# Patient Record
Sex: MALE | Race: BLACK OR AFRICAN AMERICAN | NOT HISPANIC OR LATINO | Employment: FULL TIME | URBAN - METROPOLITAN AREA
[De-identification: names, ages, dates, MRNs, and addresses within clinical notes are randomized per-mention and may not be internally consistent; named-entity substitution may affect disease eponyms.]

---

## 2023-02-02 PROBLEM — M54.50 LOWER BACK PAIN: Status: ACTIVE | Noted: 2023-02-02

## 2023-02-02 PROBLEM — H25.13 NUCLEAR SCLEROSIS OF BOTH EYES: Status: ACTIVE | Noted: 2023-02-02

## 2023-02-02 PROBLEM — A60.00 HERPES GENITALIA: Status: ACTIVE | Noted: 2023-02-02

## 2023-02-02 PROBLEM — M79.643 HAND PAIN: Status: ACTIVE | Noted: 2023-02-02

## 2023-02-02 PROBLEM — R10.13 ABDOMINAL PAIN, EPIGASTRIC: Status: ACTIVE | Noted: 2023-02-02

## 2023-02-02 PROBLEM — M48.061 SPINAL STENOSIS OF LUMBAR REGION: Status: ACTIVE | Noted: 2023-02-02

## 2023-02-02 PROBLEM — R06.00 DYSPNEA: Status: ACTIVE | Noted: 2023-02-02

## 2023-02-02 PROBLEM — L30.9 DERMATITIS: Status: ACTIVE | Noted: 2023-02-02

## 2023-02-02 PROBLEM — E78.49 FAMILIAL COMBINED HYPERLIPIDEMIA: Status: ACTIVE | Noted: 2023-02-02

## 2023-02-02 PROBLEM — M54.32 SCIATICA, LEFT SIDE: Status: ACTIVE | Noted: 2023-02-02

## 2023-02-02 PROBLEM — E66.812 CLASS 2 OBESITY WITH BODY MASS INDEX (BMI) OF 37.0 TO 37.9 IN ADULT: Status: ACTIVE | Noted: 2023-02-02

## 2023-02-02 PROBLEM — M25.569 JOINT PAIN, KNEE: Status: ACTIVE | Noted: 2023-02-02

## 2023-02-02 PROBLEM — E55.9 VITAMIN D DEFICIENCY: Status: ACTIVE | Noted: 2023-02-02

## 2023-02-02 PROBLEM — R10.30 GROIN PAIN: Status: ACTIVE | Noted: 2023-02-02

## 2023-02-02 PROBLEM — K40.90 INGUINAL HERNIA: Status: ACTIVE | Noted: 2023-02-02

## 2023-02-02 PROBLEM — N18.31 CHRONIC KIDNEY DISEASE, STAGE 3A (MULTI): Status: ACTIVE | Noted: 2023-02-02

## 2023-02-02 PROBLEM — H40.009 PREGLAUCOMA: Status: ACTIVE | Noted: 2023-02-02

## 2023-02-02 PROBLEM — E66.01 SEVERE OBESITY (BMI 35.0-35.9 WITH COMORBIDITY) (MULTI): Status: ACTIVE | Noted: 2023-02-02

## 2023-02-02 PROBLEM — R53.81 MALAISE: Status: ACTIVE | Noted: 2023-02-02

## 2023-02-02 PROBLEM — L03.113 CELLULITIS OF HAND, RIGHT: Status: ACTIVE | Noted: 2023-02-02

## 2023-02-02 PROBLEM — E66.9 OBESITY (BMI 30-39.9): Status: ACTIVE | Noted: 2023-02-02

## 2023-02-02 PROBLEM — R73.03 PREDIABETES: Status: ACTIVE | Noted: 2023-02-02

## 2023-02-02 PROBLEM — E11.9 DIABETES MELLITUS TYPE 2 WITHOUT RETINOPATHY (MULTI): Status: ACTIVE | Noted: 2023-02-02

## 2023-02-02 PROBLEM — E11.9 DIABETES (MULTI): Status: ACTIVE | Noted: 2023-02-02

## 2023-02-02 PROBLEM — M25.562 LEFT KNEE PAIN: Status: ACTIVE | Noted: 2023-02-02

## 2023-02-02 PROBLEM — I10 BENIGN ESSENTIAL HYPERTENSION: Status: ACTIVE | Noted: 2023-02-02

## 2023-02-02 PROBLEM — M54.16 LUMBAR RADICULOPATHY: Status: ACTIVE | Noted: 2023-02-02

## 2023-02-02 PROBLEM — N52.9 MALE ERECTILE DISORDER: Status: ACTIVE | Noted: 2023-02-02

## 2023-02-02 RX ORDER — GLIMEPIRIDE 1 MG/1
1 TABLET ORAL DAILY
COMMUNITY
End: 2023-03-16 | Stop reason: DRUGHIGH

## 2023-02-02 RX ORDER — GLIMEPIRIDE 4 MG/1
1 TABLET ORAL DAILY
COMMUNITY
Start: 2021-05-24 | End: 2023-06-19 | Stop reason: SDUPTHER

## 2023-02-02 RX ORDER — BLOOD SUGAR DIAGNOSTIC
STRIP MISCELLANEOUS
COMMUNITY
Start: 2018-05-18

## 2023-02-02 RX ORDER — LISINOPRIL AND HYDROCHLOROTHIAZIDE 20; 25 MG/1; MG/1
1 TABLET ORAL DAILY
COMMUNITY
Start: 2021-11-23 | End: 2023-09-22 | Stop reason: SDUPTHER

## 2023-02-02 RX ORDER — LANCETS
EACH MISCELLANEOUS
COMMUNITY

## 2023-02-02 RX ORDER — ATORVASTATIN CALCIUM 80 MG/1
1 TABLET, FILM COATED ORAL DAILY
COMMUNITY
Start: 2022-02-23 | End: 2023-09-21

## 2023-02-02 RX ORDER — ASPIRIN 81 MG/1
1 TABLET ORAL DAILY
COMMUNITY
Start: 2018-02-21

## 2023-02-02 RX ORDER — LANCETS 33 GAUGE
EACH MISCELLANEOUS
COMMUNITY
End: 2023-11-13 | Stop reason: SDUPTHER

## 2023-03-16 ENCOUNTER — OFFICE VISIT (OUTPATIENT)
Dept: PRIMARY CARE | Facility: CLINIC | Age: 68
End: 2023-03-16
Payer: MEDICARE

## 2023-03-16 ENCOUNTER — LAB (OUTPATIENT)
Dept: LAB | Facility: LAB | Age: 68
End: 2023-03-16
Payer: MEDICARE

## 2023-03-16 VITALS
HEART RATE: 92 BPM | WEIGHT: 222 LBS | BODY MASS INDEX: 36.94 KG/M2 | SYSTOLIC BLOOD PRESSURE: 118 MMHG | DIASTOLIC BLOOD PRESSURE: 76 MMHG

## 2023-03-16 DIAGNOSIS — M48.061 SPINAL STENOSIS OF LUMBAR REGION WITHOUT NEUROGENIC CLAUDICATION: ICD-10-CM

## 2023-03-16 DIAGNOSIS — E66.01 OBESITY, MORBID (MULTI): ICD-10-CM

## 2023-03-16 DIAGNOSIS — E55.9 VITAMIN D DEFICIENCY: ICD-10-CM

## 2023-03-16 DIAGNOSIS — R35.0 BENIGN PROSTATIC HYPERPLASIA WITH URINARY FREQUENCY: ICD-10-CM

## 2023-03-16 DIAGNOSIS — I10 BENIGN ESSENTIAL HYPERTENSION: ICD-10-CM

## 2023-03-16 DIAGNOSIS — E11.9 TYPE 2 DIABETES MELLITUS WITHOUT COMPLICATION, WITHOUT LONG-TERM CURRENT USE OF INSULIN (MULTI): Primary | ICD-10-CM

## 2023-03-16 DIAGNOSIS — E11.9 TYPE 2 DIABETES MELLITUS WITHOUT COMPLICATION, WITHOUT LONG-TERM CURRENT USE OF INSULIN (MULTI): ICD-10-CM

## 2023-03-16 DIAGNOSIS — R53.81 MALAISE: ICD-10-CM

## 2023-03-16 DIAGNOSIS — Z12.5 SCREENING PSA (PROSTATE SPECIFIC ANTIGEN): ICD-10-CM

## 2023-03-16 DIAGNOSIS — Z00.00 ROUTINE GENERAL MEDICAL EXAMINATION AT HEALTH CARE FACILITY: ICD-10-CM

## 2023-03-16 DIAGNOSIS — N40.1 BENIGN PROSTATIC HYPERPLASIA WITH URINARY FREQUENCY: ICD-10-CM

## 2023-03-16 DIAGNOSIS — N18.31 CHRONIC KIDNEY DISEASE, STAGE 3A (MULTI): ICD-10-CM

## 2023-03-16 LAB
ALANINE AMINOTRANSFERASE (SGPT) (U/L) IN SER/PLAS: 25 U/L (ref 10–52)
ALBUMIN (G/DL) IN SER/PLAS: 4 G/DL (ref 3.4–5)
ALBUMIN (MG/L) IN URINE: 21.2 MG/L
ALBUMIN/CREATININE (UG/MG) IN URINE: 17 UG/MG CRT (ref 0–30)
ALKALINE PHOSPHATASE (U/L) IN SER/PLAS: 120 U/L (ref 33–136)
ANION GAP IN SER/PLAS: 16 MMOL/L (ref 10–20)
APPEARANCE, URINE: CLEAR
ASPARTATE AMINOTRANSFERASE (SGOT) (U/L) IN SER/PLAS: 36 U/L (ref 9–39)
BASOPHILS (10*3/UL) IN BLOOD BY AUTOMATED COUNT: 0.08 X10E9/L (ref 0–0.1)
BASOPHILS/100 LEUKOCYTES IN BLOOD BY AUTOMATED COUNT: 1.1 % (ref 0–2)
BILIRUBIN TOTAL (MG/DL) IN SER/PLAS: 0.6 MG/DL (ref 0–1.2)
BILIRUBIN, URINE: NEGATIVE
BLOOD, URINE: NEGATIVE
CALCIDIOL (25 OH VITAMIN D3) (NG/ML) IN SER/PLAS: 30 NG/ML
CALCIUM (MG/DL) IN SER/PLAS: 9.4 MG/DL (ref 8.6–10.6)
CARBON DIOXIDE, TOTAL (MMOL/L) IN SER/PLAS: 25 MMOL/L (ref 21–32)
CHLORIDE (MMOL/L) IN SER/PLAS: 102 MMOL/L (ref 98–107)
CHOLESTEROL (MG/DL) IN SER/PLAS: 166 MG/DL (ref 0–199)
CHOLESTEROL IN HDL (MG/DL) IN SER/PLAS: 30.7 MG/DL
CHOLESTEROL/HDL RATIO: 5.4
COLOR, URINE: YELLOW
CREATININE (MG/DL) IN SER/PLAS: 1.52 MG/DL (ref 0.5–1.3)
CREATININE (MG/DL) IN URINE: 125 MG/DL (ref 20–370)
EOSINOPHILS (10*3/UL) IN BLOOD BY AUTOMATED COUNT: 0.09 X10E9/L (ref 0–0.7)
EOSINOPHILS/100 LEUKOCYTES IN BLOOD BY AUTOMATED COUNT: 1.3 % (ref 0–6)
ERYTHROCYTE DISTRIBUTION WIDTH (RATIO) BY AUTOMATED COUNT: 13.5 % (ref 11.5–14.5)
ERYTHROCYTE MEAN CORPUSCULAR HEMOGLOBIN CONCENTRATION (G/DL) BY AUTOMATED: 32.5 G/DL (ref 32–36)
ERYTHROCYTE MEAN CORPUSCULAR VOLUME (FL) BY AUTOMATED COUNT: 88 FL (ref 80–100)
ERYTHROCYTES (10*6/UL) IN BLOOD BY AUTOMATED COUNT: 5.72 X10E12/L (ref 4.5–5.9)
GFR MALE: 49 ML/MIN/1.73M2
GLUCOSE (MG/DL) IN SER/PLAS: 180 MG/DL (ref 74–99)
GLUCOSE, URINE: ABNORMAL MG/DL
HEMATOCRIT (%) IN BLOOD BY AUTOMATED COUNT: 50.1 % (ref 41–52)
HEMOGLOBIN (G/DL) IN BLOOD: 16.3 G/DL (ref 13.5–17.5)
IMMATURE GRANULOCYTES/100 LEUKOCYTES IN BLOOD BY AUTOMATED COUNT: 0.6 % (ref 0–0.9)
KETONES, URINE: NEGATIVE MG/DL
LDL: 86 MG/DL (ref 0–99)
LEUKOCYTE ESTERASE, URINE: NEGATIVE
LEUKOCYTES (10*3/UL) IN BLOOD BY AUTOMATED COUNT: 7.2 X10E9/L (ref 4.4–11.3)
LYMPHOCYTES (10*3/UL) IN BLOOD BY AUTOMATED COUNT: 2.17 X10E9/L (ref 1.2–4.8)
LYMPHOCYTES/100 LEUKOCYTES IN BLOOD BY AUTOMATED COUNT: 30.3 % (ref 13–44)
MONOCYTES (10*3/UL) IN BLOOD BY AUTOMATED COUNT: 0.6 X10E9/L (ref 0.1–1)
MONOCYTES/100 LEUKOCYTES IN BLOOD BY AUTOMATED COUNT: 8.4 % (ref 2–10)
NEUTROPHILS (10*3/UL) IN BLOOD BY AUTOMATED COUNT: 4.19 X10E9/L (ref 1.2–7.7)
NEUTROPHILS/100 LEUKOCYTES IN BLOOD BY AUTOMATED COUNT: 58.3 % (ref 40–80)
NITRITE, URINE: NEGATIVE
NON HDL CHOLESTEROL: 135 MG/DL
NRBC (PER 100 WBCS) BY AUTOMATED COUNT: 0 /100 WBC (ref 0–0)
PH, URINE: 5 (ref 5–8)
PLATELETS (10*3/UL) IN BLOOD AUTOMATED COUNT: 170 X10E9/L (ref 150–450)
POC FINGERSTICK BLOOD GLUCOSE: 179 MG/DL (ref 70–100)
POC HEMOGLOBIN A1C: 8.5 % (ref 4.2–6.5)
POTASSIUM (MMOL/L) IN SER/PLAS: 3.9 MMOL/L (ref 3.5–5.3)
PROSTATE SPECIFIC AG (NG/ML) IN SER/PLAS: 4.01 NG/ML (ref 0–4)
PROTEIN TOTAL: 7.2 G/DL (ref 6.4–8.2)
PROTEIN, URINE: NEGATIVE MG/DL
SODIUM (MMOL/L) IN SER/PLAS: 139 MMOL/L (ref 136–145)
SPECIFIC GRAVITY, URINE: 1.02 (ref 1–1.03)
THYROTROPIN (MIU/L) IN SER/PLAS BY DETECTION LIMIT <= 0.05 MIU/L: 1.62 MIU/L (ref 0.44–3.98)
TRIGLYCERIDE (MG/DL) IN SER/PLAS: 246 MG/DL (ref 0–149)
URATE (MG/DL) IN SER/PLAS: 6.9 MG/DL (ref 4–7.5)
UREA NITROGEN (MG/DL) IN SER/PLAS: 19 MG/DL (ref 6–23)
UROBILINOGEN, URINE: <2 MG/DL (ref 0–1.9)
VLDL: 49 MG/DL (ref 0–40)

## 2023-03-16 PROCEDURE — 84153 ASSAY OF PSA TOTAL: CPT

## 2023-03-16 PROCEDURE — 82306 VITAMIN D 25 HYDROXY: CPT

## 2023-03-16 PROCEDURE — G0439 PPPS, SUBSEQ VISIT: HCPCS | Performed by: INTERNAL MEDICINE

## 2023-03-16 PROCEDURE — 82570 ASSAY OF URINE CREATININE: CPT

## 2023-03-16 PROCEDURE — 99213 OFFICE O/P EST LOW 20 MIN: CPT | Performed by: INTERNAL MEDICINE

## 2023-03-16 PROCEDURE — 1159F MED LIST DOCD IN RCRD: CPT | Performed by: INTERNAL MEDICINE

## 2023-03-16 PROCEDURE — 81003 URINALYSIS AUTO W/O SCOPE: CPT

## 2023-03-16 PROCEDURE — 82043 UR ALBUMIN QUANTITATIVE: CPT

## 2023-03-16 PROCEDURE — 80061 LIPID PANEL: CPT

## 2023-03-16 PROCEDURE — 36415 COLL VENOUS BLD VENIPUNCTURE: CPT

## 2023-03-16 PROCEDURE — 3078F DIAST BP <80 MM HG: CPT | Performed by: INTERNAL MEDICINE

## 2023-03-16 PROCEDURE — 3074F SYST BP LT 130 MM HG: CPT | Performed by: INTERNAL MEDICINE

## 2023-03-16 PROCEDURE — 80053 COMPREHEN METABOLIC PANEL: CPT

## 2023-03-16 PROCEDURE — 84550 ASSAY OF BLOOD/URIC ACID: CPT

## 2023-03-16 PROCEDURE — 1160F RVW MEDS BY RX/DR IN RCRD: CPT | Performed by: INTERNAL MEDICINE

## 2023-03-16 PROCEDURE — 82962 GLUCOSE BLOOD TEST: CPT | Performed by: INTERNAL MEDICINE

## 2023-03-16 PROCEDURE — 84443 ASSAY THYROID STIM HORMONE: CPT

## 2023-03-16 PROCEDURE — 1170F FXNL STATUS ASSESSED: CPT | Performed by: INTERNAL MEDICINE

## 2023-03-16 PROCEDURE — 85025 COMPLETE CBC W/AUTO DIFF WBC: CPT

## 2023-03-16 PROCEDURE — 83036 HEMOGLOBIN GLYCOSYLATED A1C: CPT | Performed by: INTERNAL MEDICINE

## 2023-03-16 RX ORDER — GLIMEPIRIDE 2 MG/1
2 TABLET ORAL
Qty: 30 TABLET | Refills: 11 | Status: SHIPPED | OUTPATIENT
Start: 2023-03-16 | End: 2023-06-19 | Stop reason: SDUPTHER

## 2023-03-16 ASSESSMENT — ENCOUNTER SYMPTOMS
RHINORRHEA: 0
SHORTNESS OF BREATH: 0
SINUS PAIN: 0
SLEEP DISTURBANCE: 0
TROUBLE SWALLOWING: 0
COUGH: 0
ARTHRALGIAS: 0
WHEEZING: 0
CHEST TIGHTNESS: 0
NAUSEA: 0
HEADACHES: 0
FACIAL SWELLING: 0
CONSTIPATION: 0
DIZZINESS: 0
ADENOPATHY: 0
POLYPHAGIA: 0
ABDOMINAL DISTENTION: 0
STRIDOR: 0
APPETITE CHANGE: 0
HEMATURIA: 0
WOUND: 0
DYSURIA: 0
VOMITING: 0
DIARRHEA: 0
EYE ITCHING: 0
CHOKING: 0
SORE THROAT: 0
VOICE CHANGE: 0
FREQUENCY: 0
JOINT SWELLING: 0
BLOOD IN STOOL: 0
ANAL BLEEDING: 0
LIGHT-HEADEDNESS: 0
MYALGIAS: 0
CHILLS: 0
PALPITATIONS: 0
SEIZURES: 0
BACK PAIN: 0
ACTIVITY CHANGE: 0
BRUISES/BLEEDS EASILY: 0
EYE REDNESS: 0
NECK STIFFNESS: 0
WEAKNESS: 0
COLOR CHANGE: 0
PHOTOPHOBIA: 0
DIAPHORESIS: 0
SPEECH DIFFICULTY: 0
FACIAL ASYMMETRY: 0
RECTAL PAIN: 0
FLANK PAIN: 0
POLYDIPSIA: 0
NUMBNESS: 0
EYE DISCHARGE: 0
DIFFICULTY URINATING: 0
TREMORS: 0
EYE PAIN: 0
NECK PAIN: 0
SINUS PRESSURE: 0
FATIGUE: 0
ABDOMINAL PAIN: 0

## 2023-03-16 ASSESSMENT — ACTIVITIES OF DAILY LIVING (ADL)
BATHING: INDEPENDENT
DRESSING: INDEPENDENT
DOING_HOUSEWORK: INDEPENDENT
MANAGING_FINANCES: INDEPENDENT
GROCERY_SHOPPING: INDEPENDENT
TAKING_MEDICATION: INDEPENDENT

## 2023-03-16 NOTE — PROGRESS NOTES
Subjective   Reason for Visit: Ninfa Araujo is an 67 y.o. male here for a Medicare Wellness visit.          Reviewed all medications by prescribing practitioner or clinical pharmacist (such as prescriptions, OTCs, herbal therapies and supplements) and documented in the medical record.    Patient presents for wellness exam and follow-up.  He has been compliant with his medications but not diet or exercise.  He overall feels well.  He denies any headaches, no dizziness, no chest pain or shortness of breath.  Denies abdominal pain no nausea vomiting or diarrhea.  Reports no new musculoskeletal complaints.        Patient Self Assessment of Health Status  Patient Self Assessment: Good    Nutrition and Exercise  Current Diet: Unhealthy Diet  Adequate Fluid Intake: Yes  Caffeine: Yes  Exercise Frequency: Infrequently    Functional Ability/Level of Safety  Cognitive Impairment Observed: No cognitive impairment observed  Cognitive Impairment Reported: No cognitive impairment reported by patient or family    Home Safety Risk Factors: No grab bars, bathroom    Patient Care Team:  Yogesh Garcia MD as PCP - General  Yogesh Garcia MD as PCP - [employer] PCP     Review of Systems   Constitutional:  Negative for activity change, appetite change, chills, diaphoresis and fatigue.   HENT:  Negative for congestion, dental problem, drooling, ear discharge, ear pain, facial swelling, hearing loss, mouth sores, nosebleeds, postnasal drip, rhinorrhea, sinus pressure, sinus pain, sneezing, sore throat, tinnitus, trouble swallowing and voice change.    Eyes:  Negative for photophobia, pain, discharge, redness, itching and visual disturbance.   Respiratory:  Negative for cough, choking, chest tightness, shortness of breath, wheezing and stridor.    Cardiovascular:  Negative for chest pain, palpitations and leg swelling.   Gastrointestinal:  Negative for abdominal distention, abdominal pain, anal bleeding, blood in stool,  constipation, diarrhea, nausea, rectal pain and vomiting.   Endocrine: Negative for cold intolerance, heat intolerance, polydipsia, polyphagia and polyuria.   Genitourinary:  Negative for decreased urine volume, difficulty urinating, dysuria, enuresis, flank pain, frequency, genital sores, hematuria and urgency.   Musculoskeletal:  Negative for arthralgias, back pain, gait problem, joint swelling, myalgias, neck pain and neck stiffness.   Skin:  Negative for color change, pallor, rash and wound.   Neurological:  Negative for dizziness, tremors, seizures, syncope, facial asymmetry, speech difficulty, weakness, light-headedness, numbness and headaches.   Hematological:  Negative for adenopathy. Does not bruise/bleed easily.   Psychiatric/Behavioral:  Negative for sleep disturbance.        Objective   Vitals:  Wt 101 kg (222 lb)   BMI 36.94 kg/m²       Physical Exam  Constitutional:       Appearance: Normal appearance.   Cardiovascular:      Rate and Rhythm: Normal rate and regular rhythm.      Heart sounds: No murmur heard.     No gallop.   Pulmonary:      Effort: No respiratory distress.      Breath sounds: No wheezing or rales.   Abdominal:      General: There is no distension.      Palpations: There is no mass.      Tenderness: There is no abdominal tenderness. There is no guarding.   Musculoskeletal:      Right lower leg: No edema.      Left lower leg: No edema.   Neurological:      Mental Status: He is alert.     Health maintenance-colonoscopy has been done.  Refuses immunizations.    Assessment/Plan   problem List Items Addressed This Visit          Circulatory    Benign essential hypertension-he will follow a low-salt diet and exercise    Relevant Orders    Uric Acid    Urinalysis with Reflex Microscopic       Genitourinary    Chronic kidney disease, stage 3a    Overview     2/23/22 Chronic Condition Documentation: Stable based on last GFR. Continue established treatment plan including avoidance of  nephrotoxins and follow up at least yearly.         Current Assessment & Plan     Will; recheck creatinine.            Endocrine/Metabolic    Vitamin D deficiency    Relevant Orders    Vitamin D, Total    Obesity, morbid (CMS/HCC)    Current Assessment & Plan     Diet and exercise.            Other    Malaise    Relevant Orders    CBC and Auto Differential    TSH with reflex to Free T4 if abnormal     Other Visit Diagnoses       Type 2 diabetes mellitus without complication, without long-term current use of insulin (CMS/[facility])    -  Primary-hemoglobin A1c was 8.6.  He refuses an injectable agent.  Will increase glimepiride to 2 mg in the evening.  He will diet and exercise.  Risk of poor diabetic control explained.    Relevant Medications    glimepiride (AmaryL) 2 mg tablet    Other Relevant Orders    POCT Fingerstick Glucose manually resulted    POCT glycosylated hemoglobin (Hb A1C) manually resulted    Albumin , Urine Random    Comprehensive Metabolic Panel    Screening PSA (prostate specific antigen)        Relevant Orders    Prostate Specific Antigen    Benign prostatic hyperplasia with urinary frequency        Relevant Orders    Prostate Specific Antigen

## 2023-03-17 ENCOUNTER — TELEPHONE (OUTPATIENT)
Dept: PRIMARY CARE | Facility: CLINIC | Age: 68
End: 2023-03-17
Payer: MEDICARE

## 2023-03-17 DIAGNOSIS — I10 BENIGN ESSENTIAL HYPERTENSION: Primary | ICD-10-CM

## 2023-03-17 DIAGNOSIS — N40.0 BENIGN PROSTATIC HYPERPLASIA WITHOUT LOWER URINARY TRACT SYMPTOMS: ICD-10-CM

## 2023-03-17 DIAGNOSIS — Z12.5 SCREENING PSA (PROSTATE SPECIFIC ANTIGEN): ICD-10-CM

## 2023-03-17 NOTE — TELEPHONE ENCOUNTER
I spoke with patient. Take chol meds daily. LDL <70. Diet/exercise. Recheck PSA + creatinine. No NSAIDS

## 2023-05-31 DIAGNOSIS — I10 ESSENTIAL (PRIMARY) HYPERTENSION: ICD-10-CM

## 2023-06-05 RX ORDER — GLIMEPIRIDE 1 MG/1
TABLET ORAL
Qty: 90 TABLET | Refills: 1 | Status: SHIPPED | OUTPATIENT
Start: 2023-06-05 | End: 2023-06-19 | Stop reason: SDUPTHER

## 2023-06-16 RX ORDER — CHLORHEXIDINE GLUCONATE ORAL RINSE 1.2 MG/ML
15 SOLUTION DENTAL AS NEEDED
COMMUNITY
Start: 2023-05-23 | End: 2023-06-19 | Stop reason: WASHOUT

## 2023-06-16 RX ORDER — AMOXICILLIN 500 MG/1
500 TABLET, FILM COATED ORAL EVERY 6 HOURS
COMMUNITY
Start: 2023-05-23 | End: 2023-06-19 | Stop reason: WASHOUT

## 2023-06-16 RX ORDER — IBUPROFEN 800 MG/1
800 TABLET ORAL EVERY 6 HOURS PRN
COMMUNITY
Start: 2023-05-23 | End: 2023-06-19 | Stop reason: WASHOUT

## 2023-06-19 ENCOUNTER — LAB (OUTPATIENT)
Dept: LAB | Facility: LAB | Age: 68
End: 2023-06-19
Payer: MEDICARE

## 2023-06-19 ENCOUNTER — OFFICE VISIT (OUTPATIENT)
Dept: PRIMARY CARE | Facility: CLINIC | Age: 68
End: 2023-06-19
Payer: MEDICARE

## 2023-06-19 VITALS
TEMPERATURE: 97.4 F | HEIGHT: 65 IN | DIASTOLIC BLOOD PRESSURE: 80 MMHG | HEART RATE: 88 BPM | BODY MASS INDEX: 36.19 KG/M2 | SYSTOLIC BLOOD PRESSURE: 124 MMHG | WEIGHT: 217.2 LBS

## 2023-06-19 DIAGNOSIS — Z12.5 SCREENING PSA (PROSTATE SPECIFIC ANTIGEN): ICD-10-CM

## 2023-06-19 DIAGNOSIS — E78.49 FAMILIAL COMBINED HYPERLIPIDEMIA: ICD-10-CM

## 2023-06-19 DIAGNOSIS — I10 BENIGN ESSENTIAL HYPERTENSION: ICD-10-CM

## 2023-06-19 DIAGNOSIS — R06.00 DYSPNEA, UNSPECIFIED TYPE: Primary | ICD-10-CM

## 2023-06-19 DIAGNOSIS — E11.9 DIABETES MELLITUS TYPE 2 WITHOUT RETINOPATHY (MULTI): ICD-10-CM

## 2023-06-19 DIAGNOSIS — E66.01 OBESITY, MORBID (MULTI): ICD-10-CM

## 2023-06-19 PROBLEM — R73.03 PREDIABETES: Status: RESOLVED | Noted: 2023-02-02 | Resolved: 2023-06-19

## 2023-06-19 PROBLEM — L30.9 DERMATITIS: Status: RESOLVED | Noted: 2023-02-02 | Resolved: 2023-06-19

## 2023-06-19 PROBLEM — R10.13 ABDOMINAL PAIN, EPIGASTRIC: Status: RESOLVED | Noted: 2023-02-02 | Resolved: 2023-06-19

## 2023-06-19 PROBLEM — L03.113 CELLULITIS OF HAND, RIGHT: Status: RESOLVED | Noted: 2023-02-02 | Resolved: 2023-06-19

## 2023-06-19 LAB
ANION GAP IN SER/PLAS: 14 MMOL/L (ref 10–20)
CALCIUM (MG/DL) IN SER/PLAS: 10 MG/DL (ref 8.6–10.6)
CARBON DIOXIDE, TOTAL (MMOL/L) IN SER/PLAS: 29 MMOL/L (ref 21–32)
CHLORIDE (MMOL/L) IN SER/PLAS: 100 MMOL/L (ref 98–107)
CREATININE (MG/DL) IN SER/PLAS: 1.33 MG/DL (ref 0.5–1.3)
GFR MALE: 58 ML/MIN/1.73M2
GLUCOSE (MG/DL) IN SER/PLAS: 143 MG/DL (ref 74–99)
POC HEMOGLOBIN A1C: 7.2 % (ref 4.2–6.5)
POTASSIUM (MMOL/L) IN SER/PLAS: 4.2 MMOL/L (ref 3.5–5.3)
PROSTATE SPECIFIC ANTIGEN,SCREEN: 3.84 NG/ML (ref 0–4)
SODIUM (MMOL/L) IN SER/PLAS: 139 MMOL/L (ref 136–145)
UREA NITROGEN (MG/DL) IN SER/PLAS: 20 MG/DL (ref 6–23)

## 2023-06-19 PROCEDURE — 80048 BASIC METABOLIC PNL TOTAL CA: CPT

## 2023-06-19 PROCEDURE — 1160F RVW MEDS BY RX/DR IN RCRD: CPT | Performed by: INTERNAL MEDICINE

## 2023-06-19 PROCEDURE — 83036 HEMOGLOBIN GLYCOSYLATED A1C: CPT | Performed by: INTERNAL MEDICINE

## 2023-06-19 PROCEDURE — G0103 PSA SCREENING: HCPCS

## 2023-06-19 PROCEDURE — 1036F TOBACCO NON-USER: CPT | Performed by: INTERNAL MEDICINE

## 2023-06-19 PROCEDURE — 36415 COLL VENOUS BLD VENIPUNCTURE: CPT

## 2023-06-19 PROCEDURE — 1159F MED LIST DOCD IN RCRD: CPT | Performed by: INTERNAL MEDICINE

## 2023-06-19 PROCEDURE — 3079F DIAST BP 80-89 MM HG: CPT | Performed by: INTERNAL MEDICINE

## 2023-06-19 PROCEDURE — 3074F SYST BP LT 130 MM HG: CPT | Performed by: INTERNAL MEDICINE

## 2023-06-19 PROCEDURE — 99213 OFFICE O/P EST LOW 20 MIN: CPT | Performed by: INTERNAL MEDICINE

## 2023-06-19 RX ORDER — GLIMEPIRIDE 4 MG/1
4 TABLET ORAL 2 TIMES DAILY
Qty: 180 TABLET | Refills: 3 | Status: SHIPPED | OUTPATIENT
Start: 2023-06-19

## 2023-06-19 ASSESSMENT — ENCOUNTER SYMPTOMS
STRIDOR: 0
RHINORRHEA: 0
PHOTOPHOBIA: 0
ACTIVITY CHANGE: 0
SLEEP DISTURBANCE: 0
SINUS PAIN: 0
NECK PAIN: 0
SORE THROAT: 0
VOMITING: 0
ABDOMINAL PAIN: 0
EYE PAIN: 0
CHILLS: 0
DIAPHORESIS: 0
HEMATURIA: 0
SPEECH DIFFICULTY: 0
DIFFICULTY URINATING: 0
DYSURIA: 0
POLYPHAGIA: 0
FATIGUE: 0
NUMBNESS: 0
MYALGIAS: 0
ANAL BLEEDING: 0
BLOOD IN STOOL: 0
JOINT SWELLING: 0
FREQUENCY: 0
CHEST TIGHTNESS: 0
RECTAL PAIN: 0
PALPITATIONS: 0
NECK STIFFNESS: 0
WOUND: 0
VOICE CHANGE: 0
FACIAL ASYMMETRY: 0
SHORTNESS OF BREATH: 0
SEIZURES: 0
TREMORS: 0
ARTHRALGIAS: 0
EYE REDNESS: 0
COUGH: 0
POLYDIPSIA: 0
WHEEZING: 0
BRUISES/BLEEDS EASILY: 0
HEADACHES: 0
TROUBLE SWALLOWING: 0
BACK PAIN: 0
DIZZINESS: 0
COLOR CHANGE: 0
NAUSEA: 0
ABDOMINAL DISTENTION: 0
CONSTIPATION: 0
LIGHT-HEADEDNESS: 0
EYE ITCHING: 0
EYE DISCHARGE: 0
DIARRHEA: 0
FLANK PAIN: 0
CHOKING: 0
FACIAL SWELLING: 0
APPETITE CHANGE: 0
SINUS PRESSURE: 0
WEAKNESS: 0
ADENOPATHY: 0

## 2023-06-19 NOTE — PROGRESS NOTES
Subjective   Patient ID: Ninfa Araujo is a 67 y.o. male who presents for Follow-up.    Patient presents for follow-up.  He has been compliant occasions, diet and exercise.  Reports baseline knee and back pain.  He overall feels well.  Denies any headaches, no dizziness, no chest pain or shortness of breath.  No abdominal pain no nausea vomiting or diarrhea.  He reports no other new musculoskeletal complaints.         Review of Systems   Constitutional:  Negative for activity change, appetite change, chills, diaphoresis and fatigue.   HENT:  Negative for congestion, dental problem, drooling, ear discharge, ear pain, facial swelling, hearing loss, mouth sores, nosebleeds, postnasal drip, rhinorrhea, sinus pressure, sinus pain, sneezing, sore throat, tinnitus, trouble swallowing and voice change.    Eyes:  Negative for photophobia, pain, discharge, redness, itching and visual disturbance.   Respiratory:  Negative for cough, choking, chest tightness, shortness of breath, wheezing and stridor.    Cardiovascular:  Negative for chest pain, palpitations and leg swelling.   Gastrointestinal:  Negative for abdominal distention, abdominal pain, anal bleeding, blood in stool, constipation, diarrhea, nausea, rectal pain and vomiting.   Endocrine: Negative for cold intolerance, heat intolerance, polydipsia, polyphagia and polyuria.   Genitourinary:  Negative for decreased urine volume, difficulty urinating, dysuria, enuresis, flank pain, frequency, genital sores, hematuria and urgency.   Musculoskeletal:  Negative for arthralgias, back pain, gait problem, joint swelling, myalgias, neck pain and neck stiffness.   Skin:  Negative for color change, pallor, rash and wound.   Neurological:  Negative for dizziness, tremors, seizures, syncope, facial asymmetry, speech difficulty, weakness, light-headedness, numbness and headaches.   Hematological:  Negative for adenopathy. Does not bruise/bleed easily.   Psychiatric/Behavioral:   Negative for sleep disturbance.        Objective   /80   Pulse 88   Temp 36.3 °C (97.4 °F) (Temporal)   Wt 98.5 kg (217 lb 3.2 oz)   BMI 36.14 kg/m²     Physical Exam  Constitutional:       Appearance: Normal appearance.   Cardiovascular:      Rate and Rhythm: Normal rate and regular rhythm.      Heart sounds: No murmur heard.     No gallop.   Pulmonary:      Effort: No respiratory distress.      Breath sounds: No wheezing or rales.   Abdominal:      General: There is no distension.      Palpations: There is no mass.      Tenderness: There is no abdominal tenderness. There is no guarding.   Musculoskeletal:      Right lower leg: No edema.      Left lower leg: No edema.   Neurological:      Mental Status: He is alert.         Assessment/Plan   Diagnoses and all orders for this visit:    Diabetes mellitus type 2 without retinopathy (CMS/HCC)-hemoglobin A1c was 7.2.  Improved.-We will watch his diet and exercise.  Ophthalmology appointment has been done.  -     POCT glycosylated hemoglobin (Hb A1C) manually resulted  Obesity, morbid (CMS/HCC)-he will diet and exercise  Familial combined hyperlipidemia-we will continue with atorvastatin.  Benign essential hypertension-low-salt diet and exercise.  Renal sufficiency-we will recheck creatinine today.  Elevated PSA-we will recheck today.  Health maintenance-colonoscopy has been done.  Refuses immunizations

## 2023-09-21 DIAGNOSIS — E78.49 OTHER HYPERLIPIDEMIA: ICD-10-CM

## 2023-09-21 RX ORDER — ATORVASTATIN CALCIUM 80 MG/1
80 TABLET, FILM COATED ORAL DAILY
Qty: 90 TABLET | Refills: 3 | Status: SHIPPED | OUTPATIENT
Start: 2023-09-21

## 2023-09-22 ENCOUNTER — LAB (OUTPATIENT)
Dept: LAB | Facility: LAB | Age: 68
End: 2023-09-22
Payer: MEDICARE

## 2023-09-22 ENCOUNTER — OFFICE VISIT (OUTPATIENT)
Dept: PRIMARY CARE | Facility: CLINIC | Age: 68
End: 2023-09-22
Payer: MEDICARE

## 2023-09-22 VITALS
SYSTOLIC BLOOD PRESSURE: 126 MMHG | BODY MASS INDEX: 37.28 KG/M2 | HEART RATE: 84 BPM | DIASTOLIC BLOOD PRESSURE: 84 MMHG | WEIGHT: 224 LBS

## 2023-09-22 DIAGNOSIS — Z12.5 SCREENING PSA (PROSTATE SPECIFIC ANTIGEN): ICD-10-CM

## 2023-09-22 DIAGNOSIS — E11.9 DIABETES MELLITUS TYPE 2 WITHOUT RETINOPATHY (MULTI): ICD-10-CM

## 2023-09-22 DIAGNOSIS — E78.49 FAMILIAL COMBINED HYPERLIPIDEMIA: ICD-10-CM

## 2023-09-22 DIAGNOSIS — I10 BENIGN ESSENTIAL HYPERTENSION: Primary | ICD-10-CM

## 2023-09-22 DIAGNOSIS — I10 BENIGN ESSENTIAL HYPERTENSION: ICD-10-CM

## 2023-09-22 DIAGNOSIS — N18.31 CHRONIC KIDNEY DISEASE, STAGE 3A (MULTI): ICD-10-CM

## 2023-09-22 LAB
ALANINE AMINOTRANSFERASE (SGPT) (U/L) IN SER/PLAS: 27 U/L (ref 10–52)
ANION GAP IN SER/PLAS: 14 MMOL/L (ref 10–20)
ASPARTATE AMINOTRANSFERASE (SGOT) (U/L) IN SER/PLAS: 34 U/L (ref 9–39)
CALCIUM (MG/DL) IN SER/PLAS: 9.4 MG/DL (ref 8.6–10.6)
CARBON DIOXIDE, TOTAL (MMOL/L) IN SER/PLAS: 28 MMOL/L (ref 21–32)
CHLORIDE (MMOL/L) IN SER/PLAS: 102 MMOL/L (ref 98–107)
CHOLESTEROL (MG/DL) IN SER/PLAS: 144 MG/DL (ref 0–199)
CHOLESTEROL IN HDL (MG/DL) IN SER/PLAS: 30.6 MG/DL
CHOLESTEROL/HDL RATIO: 4.7
CREATININE (MG/DL) IN SER/PLAS: 1.32 MG/DL (ref 0.5–1.3)
GFR MALE: 58 ML/MIN/1.73M2
GLUCOSE (MG/DL) IN SER/PLAS: 172 MG/DL (ref 74–99)
LDL: 34 MG/DL (ref 0–99)
NON HDL CHOLESTEROL: 113 MG/DL
POC HEMOGLOBIN A1C: 8.3 % (ref 4.2–6.5)
POTASSIUM (MMOL/L) IN SER/PLAS: 4 MMOL/L (ref 3.5–5.3)
PROSTATE SPECIFIC AG (NG/ML) IN SER/PLAS: 4.01 NG/ML (ref 0–4)
SODIUM (MMOL/L) IN SER/PLAS: 140 MMOL/L (ref 136–145)
TRIGLYCERIDE (MG/DL) IN SER/PLAS: 396 MG/DL (ref 0–149)
UREA NITROGEN (MG/DL) IN SER/PLAS: 15 MG/DL (ref 6–23)
VLDL: 79 MG/DL (ref 0–40)

## 2023-09-22 PROCEDURE — 99214 OFFICE O/P EST MOD 30 MIN: CPT | Performed by: INTERNAL MEDICINE

## 2023-09-22 PROCEDURE — 80048 BASIC METABOLIC PNL TOTAL CA: CPT

## 2023-09-22 PROCEDURE — 36415 COLL VENOUS BLD VENIPUNCTURE: CPT

## 2023-09-22 PROCEDURE — 84460 ALANINE AMINO (ALT) (SGPT): CPT

## 2023-09-22 PROCEDURE — 84450 TRANSFERASE (AST) (SGOT): CPT

## 2023-09-22 PROCEDURE — 3079F DIAST BP 80-89 MM HG: CPT | Performed by: INTERNAL MEDICINE

## 2023-09-22 PROCEDURE — 1159F MED LIST DOCD IN RCRD: CPT | Performed by: INTERNAL MEDICINE

## 2023-09-22 PROCEDURE — 80061 LIPID PANEL: CPT

## 2023-09-22 PROCEDURE — 3074F SYST BP LT 130 MM HG: CPT | Performed by: INTERNAL MEDICINE

## 2023-09-22 PROCEDURE — 1160F RVW MEDS BY RX/DR IN RCRD: CPT | Performed by: INTERNAL MEDICINE

## 2023-09-22 PROCEDURE — 83036 HEMOGLOBIN GLYCOSYLATED A1C: CPT | Performed by: INTERNAL MEDICINE

## 2023-09-22 PROCEDURE — G0103 PSA SCREENING: HCPCS

## 2023-09-22 PROCEDURE — 1036F TOBACCO NON-USER: CPT | Performed by: INTERNAL MEDICINE

## 2023-09-22 RX ORDER — LISINOPRIL AND HYDROCHLOROTHIAZIDE 20; 25 MG/1; MG/1
1 TABLET ORAL DAILY
Qty: 90 TABLET | Refills: 3 | Status: SHIPPED | OUTPATIENT
Start: 2023-09-22 | End: 2023-10-03 | Stop reason: ALTCHOICE

## 2023-09-22 SDOH — HEALTH STABILITY: PHYSICAL HEALTH: ON AVERAGE, HOW MANY MINUTES DO YOU ENGAGE IN EXERCISE AT THIS LEVEL?: 0 MIN

## 2023-09-22 SDOH — HEALTH STABILITY: PHYSICAL HEALTH: ON AVERAGE, HOW MANY DAYS PER WEEK DO YOU ENGAGE IN MODERATE TO STRENUOUS EXERCISE (LIKE A BRISK WALK)?: 0 DAYS

## 2023-09-22 ASSESSMENT — ENCOUNTER SYMPTOMS
HEADACHES: 0
ABDOMINAL PAIN: 0
EYE DISCHARGE: 0
EYE REDNESS: 0
ANAL BLEEDING: 0
WOUND: 0
CHEST TIGHTNESS: 0
VOICE CHANGE: 0
SINUS PAIN: 0
SLEEP DISTURBANCE: 0
RHINORRHEA: 0
HEMATURIA: 0
DIAPHORESIS: 0
NECK PAIN: 0
BACK PAIN: 0
BLOOD IN STOOL: 0
CHOKING: 0
EYE ITCHING: 0
NAUSEA: 0
ABDOMINAL DISTENTION: 0
DYSURIA: 0
DIZZINESS: 0
SORE THROAT: 0
TROUBLE SWALLOWING: 0
APPETITE CHANGE: 0
RECTAL PAIN: 0
LIGHT-HEADEDNESS: 0
POLYDIPSIA: 0
ADENOPATHY: 0
FREQUENCY: 0
ACTIVITY CHANGE: 0
VOMITING: 0
WHEEZING: 0
BRUISES/BLEEDS EASILY: 0
CONSTIPATION: 0
PALPITATIONS: 0
DIARRHEA: 0
PHOTOPHOBIA: 0
NUMBNESS: 0
DIFFICULTY URINATING: 0
SPEECH DIFFICULTY: 0
SINUS PRESSURE: 0
EYE PAIN: 0
COLOR CHANGE: 0
FATIGUE: 0
FACIAL ASYMMETRY: 0
POLYPHAGIA: 0
SHORTNESS OF BREATH: 0
STRIDOR: 0
COUGH: 0
CHILLS: 0
MYALGIAS: 0
SEIZURES: 0
JOINT SWELLING: 0
NECK STIFFNESS: 0
ARTHRALGIAS: 0
WEAKNESS: 0
TREMORS: 0
FACIAL SWELLING: 0

## 2023-09-22 ASSESSMENT — LIFESTYLE VARIABLES
AUDIT-C TOTAL SCORE: 1
HOW MANY STANDARD DRINKS CONTAINING ALCOHOL DO YOU HAVE ON A TYPICAL DAY: 1 OR 2
HOW OFTEN DO YOU HAVE A DRINK CONTAINING ALCOHOL: MONTHLY OR LESS
SKIP TO QUESTIONS 9-10: 1
HOW OFTEN DO YOU HAVE SIX OR MORE DRINKS ON ONE OCCASION: NEVER

## 2023-09-22 ASSESSMENT — PATIENT HEALTH QUESTIONNAIRE - PHQ9
1. LITTLE INTEREST OR PLEASURE IN DOING THINGS: NOT AT ALL
2. FEELING DOWN, DEPRESSED OR HOPELESS: NOT AT ALL
SUM OF ALL RESPONSES TO PHQ9 QUESTIONS 1 & 2: 0

## 2023-09-22 NOTE — PROGRESS NOTES
Subjective   Patient ID: Juan Antonio Araujo is a 68 y.o. male who presents for No chief complaint on file..    Patient presents for follow-up.  He has been compliant with his medications but not diet or exercise.  He reports his sugars have been elevated.  He overall feels well.  He denies any headaches, no dizziness, no chest pain or shortness of breath.  Denies abdominal pain no nausea vomiting or diarrhea.  Reports no new musculoskeletal complaints.         Review of Systems   Constitutional:  Negative for activity change, appetite change, chills, diaphoresis and fatigue.   HENT:  Negative for congestion, dental problem, drooling, ear discharge, ear pain, facial swelling, hearing loss, mouth sores, nosebleeds, postnasal drip, rhinorrhea, sinus pressure, sinus pain, sneezing, sore throat, tinnitus, trouble swallowing and voice change.    Eyes:  Negative for photophobia, pain, discharge, redness, itching and visual disturbance.   Respiratory:  Negative for cough, choking, chest tightness, shortness of breath, wheezing and stridor.    Cardiovascular:  Negative for chest pain, palpitations and leg swelling.   Gastrointestinal:  Negative for abdominal distention, abdominal pain, anal bleeding, blood in stool, constipation, diarrhea, nausea, rectal pain and vomiting.   Endocrine: Negative for cold intolerance, heat intolerance, polydipsia, polyphagia and polyuria.   Genitourinary:  Negative for decreased urine volume, difficulty urinating, dysuria, enuresis, frequency, genital sores, hematuria and urgency.   Musculoskeletal:  Negative for arthralgias, back pain, gait problem, joint swelling, myalgias, neck pain and neck stiffness.   Skin:  Negative for color change, pallor, rash and wound.   Neurological:  Negative for dizziness, tremors, seizures, syncope, facial asymmetry, speech difficulty, weakness, light-headedness, numbness and headaches.   Hematological:  Negative for adenopathy. Does not bruise/bleed easily.    Psychiatric/Behavioral:  Negative for sleep disturbance.        Objective   /84   Pulse 84   Wt 102 kg (224 lb)   BMI 37.28 kg/m²     Physical Exam  Constitutional:       Appearance: Normal appearance.   Cardiovascular:      Rate and Rhythm: Normal rate and regular rhythm.      Heart sounds: No murmur heard.     No gallop.   Pulmonary:      Effort: No respiratory distress.      Breath sounds: No wheezing or rales.   Abdominal:      General: There is no distension.      Palpations: There is no mass.      Tenderness: There is no abdominal tenderness. There is no guarding.   Musculoskeletal:      Right lower leg: No edema.      Left lower leg: No edema.   Neurological:      Mental Status: He is alert.         Assessment/Plan   Diagnoses and all orders for this visit:  Benign essential hypertension-low-salt diet and exercise.-Check a fasting lipid profile  -     lisinopriL-hydrochlorothiazide 20-25 mg tablet; Take 1 tablet by mouth once daily.  -     Basic Metabolic Panel; Future  -     Lipid Panel; Future  -     Alanine Aminotransferase; Future  -     Aspartate Aminotransferase; Future  Familial combined hyperlipidemia-check a fast lipid profile  Screening PSA (prostate specific antigen)-recheck today.  Health maintenance-colonoscopy has  -     PSA; Future  Diabetes mellitus type 2 without retinopathy (CMS/HCC)-we will start Ozempic.  Patient educated on how to give the medication.  No family history of medullary thyroid cancer or pancreatic issues.  No  history of multiple endocrine neoplasia.  -     semaglutide 0.25 mg or 0.5 mg (2 mg/3 mL) pen injector; Inject 0.25 mg under the skin 1 (one) time per week.  -     Follow Up In [facility] - Pharmacy; Future  Chronic kidney disease, stage 3a (CMS/HCC)-we will recheck creatinine  Health maintenance-colonoscopy has been done.  Refuses immunizations

## 2023-10-03 ENCOUNTER — TELEMEDICINE CLINICAL SUPPORT (OUTPATIENT)
Dept: PHARMACY | Facility: HOSPITAL | Age: 68
End: 2023-10-03
Payer: MEDICARE

## 2023-10-03 DIAGNOSIS — E11.9 DIABETES MELLITUS TYPE 2 WITHOUT RETINOPATHY (MULTI): ICD-10-CM

## 2023-10-03 ASSESSMENT — ENCOUNTER SYMPTOMS: DIABETIC ASSOCIATED SYMPTOMS: 0

## 2023-10-03 NOTE — ASSESSMENT & PLAN NOTE
Patients diabetes is poorly controlled with most recent A1c of 8.3% (Goal < 7%).   Continue: Jardiance 25 mg daily and glimepiride 4 mg twice daily  Plan is to focus on improving patient's diet over the next few weeks. Educated patient on what foods are carbs (bread, pasta, rice, potatoes, corn) and to eat these in moderation. Preferably, not more than 1 carb choice per meal. Recommended he increase protein and reduce carb intake to increase satiety. Also recommended using healthy plate method for meals: 1/2 plate vegetables, 1/4 plate protein, 1/4 plate carbs. Patient also states he drinks several cups of coffee throughout the day and puts creamer in it. Suggested using regular milk or sugar free sweetener instead.  If blood sugar numbers don't improve at next follow-up, will consider further therapy. Could potentially switch glimepiride to glipizide with patient's kidney function. Could also consider Januvia/Tradjenta as patient is wanting something that won't cause stomach upset.  Compliance at present is estimated to be fair. Efforts to improve compliance (if necessary) will be directed at dietary modifications: as aboce.  Follow-up: 10/24/23 2pm

## 2023-10-03 NOTE — PROGRESS NOTES
Pharmacist Clinic: Diabetes Management  Juan Antonio Araujo is a 68 y.o. male was referred to Clinical Pharmacy Team for diabetes management.   Referring Provider: Yogesh Garcia MD     Subjective   Diabetes  He presents for his initial diabetic visit. He has type 2 diabetes mellitus. There are no hypoglycemic associated symptoms. There are no diabetic associated symptoms. There are no hypoglycemic complications. Risk factors for coronary artery disease include diabetes mellitus, dyslipidemia, male sex and obesity. He is following a generally unhealthy diet. When asked about meal planning, he reported none. He rarely participates in exercise.     HPI    Jardiance 25 mg daily  Glimepiride 4 mg twice daily  Ozempic sent; not filled - patient doesn't want to take it; has GI upset already and doesn't want anything to worsen it    Current diet:   - Diet very inconsistent  - Drinks a lot of coffee; will drink a lot and forget to eat; creamer  - 1 or 2 meals/day  - Feels diet is biggest factor that led to A1c worsening    Current exercise:   -  at [employer] and owns  home; very busy, hard to find time to exercise    Current monitoring regimen:   Patient is using: glucometer    Reported blood sugars:   FBG - 140-150  Right after meal - 160-170  Not testing 2 hours post prandial   Never over 200    Any episodes of hypoglycemia? no    Adverse Effects:   no    No Known Allergies    Objective     There were no vitals taken for this visit.    Diabetes Pharmacotherapy:    Jardiance 25 mg daily  Glimepiride 4 mg twice daily    Historical Pharmacotherapy:  - Metformin (stomach upset)    SECONDARY PREVENTION  - Statin? Yes   - ACE-I/ARB? No  - Aspirin? Yes    Pertinent PMH Review:  - PMH of Pancreatitis: No  - PMH of Retinopathy: No  - PMH of Urinary Tract Infections: No  - PMH of MTC: No    Lab Review  Lab Results   Component Value Date    BILITOT 0.6 2023    CALCIUM 9.4 2023    CO2 28 2023      09/22/2023    CREATININE 1.32 (H) 09/22/2023    GLUCOSE 172 (H) 09/22/2023    ALKPHOS 120 03/16/2023    K 4.0 09/22/2023    PROT 7.2 03/16/2023     09/22/2023    AST 34 09/22/2023    ALT 27 09/22/2023    BUN 15 09/22/2023    ANIONGAP 14 09/22/2023    ALBUMIN 4.0 03/16/2023    GFRMALE 58 (A) 09/22/2023     Lab Results   Component Value Date    TRIG 396 (H) 09/22/2023    CHOL 144 09/22/2023    HDL 30.6 (A) 09/22/2023     Lab Results   Component Value Date    HGBA1C 8.3 (A) 09/22/2023    HGBA1C 7.2 (A) 06/19/2023    HGBA1C 8.5 (A) 03/16/2023     The 10-year ASCVD risk score (Jessica LOPES, et al., 2019) is: 21%    Values used to calculate the score:      Age: 68 years      Sex: Male      Is Non- : Yes      Diabetic: Yes      Tobacco smoker: No      Systolic Blood Pressure: 126 mmHg      Is BP treated: No      HDL Cholesterol: 30.6 mg/dL      Total Cholesterol: 144 mg/dL    Drug Interactions:  none    Assessment/Plan   Problem List Items Addressed This Visit       Diabetes mellitus type 2 without retinopathy (CMS/HCC)     Patients diabetes is poorly controlled with most recent A1c of 8.3% (Goal < 7%).   Continue: Jardiance 25 mg daily and glimepiride 4 mg twice daily  Plan is to focus on improving patient's diet over the next few weeks. Educated patient on what foods are carbs (bread, pasta, rice, potatoes, corn) and to eat these in moderation. Preferably, not more than 1 carb choice per meal. Recommended he increase protein and reduce carb intake to increase satiety. Also recommended using healthy plate method for meals: 1/2 plate vegetables, 1/4 plate protein, 1/4 plate carbs. Patient also states he drinks several cups of coffee throughout the day and puts creamer in it. Suggested using regular milk or sugar free sweetener instead.  If blood sugar numbers don't improve at next follow-up, will consider further therapy. Could potentially switch glimepiride to glipizide with patient's kidney  function. Could also consider Januvia/Tradjenta as patient is wanting something that won't cause stomach upset.  Compliance at present is estimated to be fair. Efforts to improve compliance (if necessary) will be directed at dietary modifications: as aboce.  Follow-up: 10/24/23 2pm         Relevant Orders    Follow Up In [facility] - Pharmacy     Beverly Valdez, PharmD, [facility]  Clinical Pharmacist     Continue all meds under the continuation of care with the referring provider and clinical pharmacy team.

## 2023-10-24 ENCOUNTER — TELEMEDICINE (OUTPATIENT)
Dept: PHARMACY | Facility: HOSPITAL | Age: 68
End: 2023-10-24
Payer: MEDICARE

## 2023-10-24 DIAGNOSIS — E11.9 DIABETES MELLITUS TYPE 2 WITHOUT RETINOPATHY (MULTI): ICD-10-CM

## 2023-10-24 ASSESSMENT — ENCOUNTER SYMPTOMS: DIABETIC ASSOCIATED SYMPTOMS: 0

## 2023-10-24 NOTE — PROGRESS NOTES
Pharmacist Clinic: Diabetes Management  Juan Antonio Araujo is a 68 y.o. male was referred to Clinical Pharmacy Team for diabetes management.   Referring Provider: Yogesh Garcia MD     Subjective   Diabetes  He presents for his follow-up diabetic visit. He has type 2 diabetes mellitus. There are no hypoglycemic associated symptoms. There are no diabetic associated symptoms. There are no hypoglycemic complications. Risk factors for coronary artery disease include diabetes mellitus, dyslipidemia, male sex and obesity. He is following a generally unhealthy diet. When asked about meal planning, he reported none. He rarely participates in exercise.     HPI    - Patient working on diet improvements as he is very close to controlled BG #'s with current therapy    Current diet:   - Diet very inconsistent  - Drinks a lot of coffee; will drink a lot and forget to eat; stopped putting creamer in coffee  - 1 or 2 meals/day  - Feels diet is biggest factor that led to A1c worsening    Current exercise:   -  at [employer] and owns  home; very busy, hard to find time to exercise    Current monitoring regimen:   Patient is using: glucometer    Reported blood sugars:   FBG - 130s  30 mins after meals - 140s    Any episodes of hypoglycemia? no    Adverse Effects:   no    No Known Allergies    Objective     There were no vitals taken for this visit.    Diabetes Pharmacotherapy:    Jardiance 25 mg daily  Glimepiride 4 mg twice daily    Historical Pharmacotherapy:  - Metformin (stomach upset)  - Ozempic (never taken - does not want to take for potential stomach upset)    SECONDARY PREVENTION  - Statin? Yes   - ACE-I/ARB? No  - Aspirin? Yes    Pertinent PMH Review:  - PMH of Pancreatitis: No  - PMH of Retinopathy: No  - PMH of Urinary Tract Infections: No  - PMH of MTC: No    Lab Review  Lab Results   Component Value Date    BILITOT 0.6 2023    CALCIUM 9.4 2023    CO2 28 2023     2023    CREATININE  1.32 (H) 09/22/2023    GLUCOSE 172 (H) 09/22/2023    ALKPHOS 120 03/16/2023    K 4.0 09/22/2023    PROT 7.2 03/16/2023     09/22/2023    AST 34 09/22/2023    ALT 27 09/22/2023    BUN 15 09/22/2023    ANIONGAP 14 09/22/2023    ALBUMIN 4.0 03/16/2023    GFRMALE 58 (A) 09/22/2023     Lab Results   Component Value Date    TRIG 396 (H) 09/22/2023    CHOL 144 09/22/2023    HDL 30.6 (A) 09/22/2023     Lab Results   Component Value Date    HGBA1C 8.3 (A) 09/22/2023    HGBA1C 7.2 (A) 06/19/2023    HGBA1C 8.5 (A) 03/16/2023     The 10-year ASCVD risk score (Jessica LOPES, et al., 2019) is: 21%    Values used to calculate the score:      Age: 68 years      Sex: Male      Is Non- : Yes      Diabetic: Yes      Tobacco smoker: No      Systolic Blood Pressure: 126 mmHg      Is BP treated: No      HDL Cholesterol: 30.6 mg/dL      Total Cholesterol: 144 mg/dL    Drug Interactions:  none    Assessment/Plan   Problem List Items Addressed This Visit       Diabetes mellitus type 2 without retinopathy (CMS/HCC)     Patients diabetes is poorly controlled with most recent A1c of 8.3% (Goal < 7%).   Continue: Jardiance 25 mg daily and glimepiride 4 mg twice daily  Patient stopped putting sugary creamer in his coffee and it's really made a difference! Educated patient on what foods are carbs (bread, pasta, rice, potatoes, corn) and to eat these in moderation. Preferably, not more than 1 carb choice per meal. Recommended he increase protein and reduce carb intake to increase satiety. Also recommended using healthy plate method for meals: 1/2 plate vegetables, 1/4 plate protein, 1/4 plate carbs.  Patient is very close to control. FBG hovering around 130. Requested patient to test 2HPP to assess prandial control. If FBG >130 or 2HPP >180 at follow up, consider adding DPP4 (Tradjenta/Januvia $45/month).  Compliance at present is estimated to be fair. Efforts to improve compliance (if necessary) will be directed at  dietary modifications: as aboce.  Follow-up: 11/7/23 2pm         Relevant Medications    empagliflozin (Jardiance) 25 mg    Other Relevant Orders    Follow Up In [facility] - Pharmacy   Beverly Valdez, PharmD, [facility]  Clinical Pharmacist     Continue all meds under the continuation of care with the referring provider and clinical pharmacy team.

## 2023-10-24 NOTE — ASSESSMENT & PLAN NOTE
Patients diabetes is poorly controlled with most recent A1c of 8.3% (Goal < 7%).   Continue: Jardiance 25 mg daily and glimepiride 4 mg twice daily  Patient stopped putting sugary creamer in his coffee and it's really made a difference! Educated patient on what foods are carbs (bread, pasta, rice, potatoes, corn) and to eat these in moderation. Preferably, not more than 1 carb choice per meal. Recommended he increase protein and reduce carb intake to increase satiety. Also recommended using healthy plate method for meals: 1/2 plate vegetables, 1/4 plate protein, 1/4 plate carbs.  Patient is very close to control. FBG hovering around 130. Requested patient to test 2HPP to assess prandial control. If FBG >130 or 2HPP >180 at follow up, consider adding DPP4 (Tradjenta/Januvia $45/month).  Compliance at present is estimated to be fair. Efforts to improve compliance (if necessary) will be directed at dietary modifications: as aboce.  Follow-up: 11/7/23 2pm

## 2023-11-13 ENCOUNTER — TELEMEDICINE (OUTPATIENT)
Dept: PHARMACY | Facility: HOSPITAL | Age: 68
End: 2023-11-13
Payer: MEDICARE

## 2023-11-13 ENCOUNTER — OFFICE VISIT (OUTPATIENT)
Dept: PRIMARY CARE | Facility: CLINIC | Age: 68
End: 2023-11-13
Payer: MEDICARE

## 2023-11-13 ENCOUNTER — PHARMACY VISIT (OUTPATIENT)
Dept: PHARMACY | Facility: CLINIC | Age: 68
End: 2023-11-13
Payer: COMMERCIAL

## 2023-11-13 VITALS
DIASTOLIC BLOOD PRESSURE: 82 MMHG | WEIGHT: 228 LBS | BODY MASS INDEX: 37.94 KG/M2 | SYSTOLIC BLOOD PRESSURE: 130 MMHG | HEART RATE: 88 BPM

## 2023-11-13 DIAGNOSIS — E66.01 OBESITY, MORBID (MULTI): ICD-10-CM

## 2023-11-13 DIAGNOSIS — E11.9 DIABETES MELLITUS TYPE 2 WITHOUT RETINOPATHY (MULTI): ICD-10-CM

## 2023-11-13 DIAGNOSIS — M48.061 SPINAL STENOSIS OF LUMBAR REGION WITHOUT NEUROGENIC CLAUDICATION: ICD-10-CM

## 2023-11-13 DIAGNOSIS — N18.31 CHRONIC KIDNEY DISEASE, STAGE 3A (MULTI): ICD-10-CM

## 2023-11-13 DIAGNOSIS — I10 BENIGN ESSENTIAL HYPERTENSION: ICD-10-CM

## 2023-11-13 DIAGNOSIS — E78.49 FAMILIAL COMBINED HYPERLIPIDEMIA: Primary | ICD-10-CM

## 2023-11-13 PROCEDURE — 1159F MED LIST DOCD IN RCRD: CPT | Performed by: INTERNAL MEDICINE

## 2023-11-13 PROCEDURE — 1160F RVW MEDS BY RX/DR IN RCRD: CPT | Performed by: INTERNAL MEDICINE

## 2023-11-13 PROCEDURE — 3079F DIAST BP 80-89 MM HG: CPT | Performed by: INTERNAL MEDICINE

## 2023-11-13 PROCEDURE — 1036F TOBACCO NON-USER: CPT | Performed by: INTERNAL MEDICINE

## 2023-11-13 PROCEDURE — 99213 OFFICE O/P EST LOW 20 MIN: CPT | Performed by: INTERNAL MEDICINE

## 2023-11-13 PROCEDURE — 3075F SYST BP GE 130 - 139MM HG: CPT | Performed by: INTERNAL MEDICINE

## 2023-11-13 RX ORDER — LANCETS 33 GAUGE
EACH MISCELLANEOUS
Qty: 100 EACH | Refills: 2 | Status: SHIPPED | OUTPATIENT
Start: 2023-11-13

## 2023-11-13 ASSESSMENT — ENCOUNTER SYMPTOMS
ANAL BLEEDING: 0
SINUS PAIN: 0
DIFFICULTY URINATING: 0
CHOKING: 0
FLANK PAIN: 0
WEAKNESS: 0
SHORTNESS OF BREATH: 0
NECK STIFFNESS: 0
POLYDIPSIA: 0
COLOR CHANGE: 0
RHINORRHEA: 0
DIARRHEA: 0
JOINT SWELLING: 0
WOUND: 0
ADENOPATHY: 0
STRIDOR: 0
TROUBLE SWALLOWING: 0
ARTHRALGIAS: 0
POLYPHAGIA: 0
CONSTIPATION: 0
SPEECH DIFFICULTY: 0
SORE THROAT: 0
DYSURIA: 0
COUGH: 0
LIGHT-HEADEDNESS: 0
DIAPHORESIS: 0
ABDOMINAL PAIN: 0
HEADACHES: 0
SINUS PRESSURE: 0
VOMITING: 0
CHILLS: 0
FACIAL SWELLING: 0
EYE PAIN: 0
EYE ITCHING: 0
ABDOMINAL DISTENTION: 0
NECK PAIN: 0
NUMBNESS: 0
PALPITATIONS: 0
RECTAL PAIN: 0
FATIGUE: 0
TREMORS: 0
SEIZURES: 0
SLEEP DISTURBANCE: 0
VOICE CHANGE: 0
ACTIVITY CHANGE: 0
BRUISES/BLEEDS EASILY: 0
CHEST TIGHTNESS: 0
BACK PAIN: 0
PHOTOPHOBIA: 0
WHEEZING: 0
FACIAL ASYMMETRY: 0
HEMATURIA: 0
APPETITE CHANGE: 0
DIABETIC ASSOCIATED SYMPTOMS: 0
NAUSEA: 0
MYALGIAS: 0
BLOOD IN STOOL: 0
FREQUENCY: 0
EYE REDNESS: 0
DIZZINESS: 0
EYE DISCHARGE: 0

## 2023-11-13 ASSESSMENT — PATIENT HEALTH QUESTIONNAIRE - PHQ9
2. FEELING DOWN, DEPRESSED OR HOPELESS: NOT AT ALL
SUM OF ALL RESPONSES TO PHQ9 QUESTIONS 1 AND 2: 0
1. LITTLE INTEREST OR PLEASURE IN DOING THINGS: NOT AT ALL

## 2023-11-13 NOTE — ASSESSMENT & PLAN NOTE
"Patients diabetes is poorly controlled with most recent A1c of 8.3% (Goal < 7%).   Continue: Jardiance 25 mg daily and glimepiride 4 mg twice daily  Patient's blood sugars are within range (FBG <130; 2HPP <180)  Patient stopped putting sugary creamer in his coffee and it's really made a difference! He has also made a huge difference in his diet - eating 3 john meals/day and focusing on each meal being more \"green\" and increasing vegetables.  Patient due for A1c check at PCP appt 12/22 - will use this as barometer for control and need for further therapy as reported blood sugars are within range.  Compliance at present is estimated to be fair. Efforts to improve compliance (if necessary) will be directed at dietary modifications: as aboce.  Follow-up: 12/13/23 2pm  "

## 2023-11-13 NOTE — PROGRESS NOTES
Subjective   Patient ID: Juan Antonio Araujo is a 68 y.o. male who presents for No chief complaint on file..    Patient presents for follow-up.  He has been compliant with his medications, diet but not exercise.  He decided not to take Ozempic due to fear of side effects.  He reports that his sugars are in the low to mid 100s at home.  He denies any headaches, no dizziness, no chest pain or shortness of breath.  Denies abdominal pain no nausea vomiting or diarrhea.  Reports baseline back and knee pain.         Review of Systems   Constitutional:  Negative for activity change, appetite change, chills, diaphoresis and fatigue.   HENT:  Negative for congestion, dental problem, drooling, ear discharge, ear pain, facial swelling, hearing loss, mouth sores, nosebleeds, postnasal drip, rhinorrhea, sinus pressure, sinus pain, sneezing, sore throat, tinnitus, trouble swallowing and voice change.    Eyes:  Negative for photophobia, pain, discharge, redness, itching and visual disturbance.   Respiratory:  Negative for cough, choking, chest tightness, shortness of breath, wheezing and stridor.    Cardiovascular:  Negative for chest pain, palpitations and leg swelling.   Gastrointestinal:  Negative for abdominal distention, abdominal pain, anal bleeding, blood in stool, constipation, diarrhea, nausea, rectal pain and vomiting.   Endocrine: Negative for cold intolerance, heat intolerance, polydipsia, polyphagia and polyuria.   Genitourinary:  Negative for decreased urine volume, difficulty urinating, dysuria, enuresis, flank pain, frequency, genital sores, hematuria and urgency.   Musculoskeletal:  Negative for arthralgias, back pain, gait problem, joint swelling, myalgias, neck pain and neck stiffness.   Skin:  Negative for color change, pallor, rash and wound.   Neurological:  Negative for dizziness, tremors, seizures, syncope, facial asymmetry, speech difficulty, weakness, light-headedness, numbness and headaches.    Hematological:  Negative for adenopathy. Does not bruise/bleed easily.   Psychiatric/Behavioral:  Negative for sleep disturbance.        Objective   /82   Pulse 88   Wt 103 kg (228 lb)   BMI 37.94 kg/m²     Physical Exam  Constitutional:       Appearance: Normal appearance.   Cardiovascular:      Rate and Rhythm: Normal rate and regular rhythm.      Heart sounds: No murmur heard.     No gallop.   Pulmonary:      Effort: No respiratory distress.      Breath sounds: No wheezing or rales.   Abdominal:      General: There is no distension.      Palpations: There is no mass.      Tenderness: There is no abdominal tenderness. There is no guarding.   Musculoskeletal:      Right lower leg: No edema.      Left lower leg: No edema.   Neurological:      Mental Status: He is alert.         Assessment/Plan     Diagnoses and all orders for this visit:  Familial combined hyperlipidemia-we will continue with atorvastatin  Benign essential hypertension-stable on present medication  Obesity, morbid (CMS/HCC)-diet and exercise  Diabetes mellitus type 2 without retinopathy (CMS/HCC)-hemoglobin A1c of 1 month.  Ophthalmology appointment has been done.  Diet and exercise.  Chronic kidney disease, stage 3a (CMS/HCC)-creatinine has been stable  Spinal stenosis of lumbar region without neurogenic claudication-stable symptoms.  Health maintenance-colonoscopy has been done.  Flu shot has been done.  Elevated PSA-we will schedule appointment with urology

## 2023-11-13 NOTE — PROGRESS NOTES
Pharmacist Clinic: Diabetes Management  Juan Antonio Araujo is a 68 y.o. male was referred to Clinical Pharmacy Team for diabetes management.   Referring Provider: Yogesh Garcia MD     Subjective   Diabetes  He presents for his follow-up diabetic visit. He has type 2 diabetes mellitus. There are no hypoglycemic associated symptoms. There are no diabetic associated symptoms. There are no hypoglycemic complications. Risk factors for coronary artery disease include diabetes mellitus, dyslipidemia, male sex and obesity. He is following a generally unhealthy diet. When asked about meal planning, he reported none. He rarely participates in exercise.     HPI    Current diet:   - Patient has cut out coffee w/ sugary creamer  - 3 meals/day (small); trying to increase veggies; staying away from bread and not eating after 8pm  - Feels diet is biggest factor that led to A1c worsening    Current exercise:   -  at [employer] and owns  home; very busy, hard to find time to exercise    Current monitoring regimen:   Patient is using: glucometer    Reported blood sugars:   FBG - 125-130  2H after breakfast - 150-160    Any episodes of hypoglycemia? no    Adverse Effects:   no    No Known Allergies    Objective     There were no vitals taken for this visit.    Diabetes Pharmacotherapy:    Jardiance 25 mg daily  Glimepiride 4 mg twice daily    Historical Pharmacotherapy:  - Metformin (stomach upset)  - Ozempic (never taken - does not want to take for potential stomach upset)    SECONDARY PREVENTION  - Statin? Yes   - ACE-I/ARB? No  - Aspirin? Yes    Pertinent PMH Review:  - PMH of Pancreatitis: No  - PMH of Retinopathy: No  - PMH of Urinary Tract Infections: No  - PMH of MTC: No    Lab Review  Lab Results   Component Value Date    BILITOT 0.6 2023    CALCIUM 9.4 2023    CO2 28 2023     2023    CREATININE 1.32 (H) 2023    GLUCOSE 172 (H) 2023    ALKPHOS 120 2023    K 4.0 2023  "   PROT 7.2 03/16/2023     09/22/2023    AST 34 09/22/2023    ALT 27 09/22/2023    BUN 15 09/22/2023    ANIONGAP 14 09/22/2023    ALBUMIN 4.0 03/16/2023    GFRMALE 58 (A) 09/22/2023     Lab Results   Component Value Date    TRIG 396 (H) 09/22/2023    CHOL 144 09/22/2023    HDL 30.6 (A) 09/22/2023     Lab Results   Component Value Date    HGBA1C 8.3 (A) 09/22/2023    HGBA1C 7.2 (A) 06/19/2023    HGBA1C 8.5 (A) 03/16/2023     The 10-year ASCVD risk score (Jessica LOPES, et al., 2019) is: 22.1%    Values used to calculate the score:      Age: 68 years      Sex: Male      Is Non- : Yes      Diabetic: Yes      Tobacco smoker: No      Systolic Blood Pressure: 130 mmHg      Is BP treated: No      HDL Cholesterol: 30.6 mg/dL      Total Cholesterol: 144 mg/dL    Drug Interactions:  none    Assessment/Plan   Problem List Items Addressed This Visit       Diabetes mellitus type 2 without retinopathy (CMS/[facility])     Patients diabetes is poorly controlled with most recent A1c of 8.3% (Goal < 7%).   Continue: Jardiance 25 mg daily and glimepiride 4 mg twice daily  Patient's blood sugars are within range (FBG <130; 2HPP <180)  Patient stopped putting sugary creamer in his coffee and it's really made a difference! He has also made a huge difference in his diet - eating 3 john meals/day and focusing on each meal being more \"green\" and increasing vegetables.  Patient due for A1c check at PCP appt 12/22 - will use this as barometer for control and need for further therapy as reported blood sugars are within range.  Compliance at present is estimated to be fair. Efforts to improve compliance (if necessary) will be directed at dietary modifications: as aboce.  Follow-up: 12/13/23 2pm         Relevant Medications    lancets (OneTouch Delica Lancets) 33 gauge misc    Other Relevant Orders    Follow Up In [facility] - Pharmacy     Beverly Valdez, PharmD, [facility]  Clinical Pharmacist     Continue all meds " under the continuation of care with the referring provider and clinical pharmacy team.

## 2023-12-01 ENCOUNTER — TELEPHONE (OUTPATIENT)
Dept: PRIMARY CARE | Facility: CLINIC | Age: 68
End: 2023-12-01
Payer: MEDICARE

## 2023-12-01 DIAGNOSIS — I10 BENIGN ESSENTIAL HYPERTENSION: Primary | ICD-10-CM

## 2023-12-01 RX ORDER — LISINOPRIL AND HYDROCHLOROTHIAZIDE 20; 25 MG/1; MG/1
1 TABLET ORAL DAILY
Qty: 90 TABLET | Refills: 3 | Status: SHIPPED | OUTPATIENT
Start: 2023-12-01 | End: 2024-11-25

## 2023-12-01 NOTE — TELEPHONE ENCOUNTER
Patient would like to know if you stopped his Lisinopril because he could not get his meds filled because they said that the doctor has cancelled it

## 2023-12-05 NOTE — PROGRESS NOTES
UROLOGIC INITIAL EVALUATION       PROBLEM LIST:  1. No diagnosis found.     HISTORY OF PRESENT ILLNESS:     Juan Antonio Araujo is a 68 y.o. male who was first seen on *** for evaluation of ***    PAST MEDICAL HISTORY:  Past Medical History:   Diagnosis Date    Essential (primary) hypertension 09/24/2013    Benign essential hypertension    Lower abdominal pain, unspecified     Groin pain    Personal history of other endocrine, nutritional and metabolic disease     History of hyperlipidemia       PAST SURGICAL HISTORY:  Past Surgical History:   Procedure Laterality Date    COLONOSCOPY  05/06/2016    Complete Colonoscopy    COLONOSCOPY  01/2021    rpt 1-26    ROTATOR CUFF REPAIR  08/27/2013    Rotator Cuff Repair        ALLERGIES:   No Known Allergies     MEDICATIONS:     Current Outpatient Medications:     aspirin 81 mg EC tablet, Take 1 tablet (81 mg) by mouth 1 (one) time each day., Disp: , Rfl:     atorvastatin (Lipitor) 80 mg tablet, TAKE 1 TABLET BY MOUTH EVERY DAY, Disp: 90 tablet, Rfl: 3    empagliflozin (Jardiance) 25 mg, Take 1 tablet (25 mg) by mouth once daily., Disp: 90 tablet, Rfl: 3    glimepiride (Amaryl) 4 mg tablet, Take 1 tablet (4 mg) by mouth 2 times a day., Disp: 180 tablet, Rfl: 3    lancets 33 gauge misc, Use to test blood sugars twice daily, Disp: 100 each, Rfl: 2    lancets (OneTouch UltraSoft Lancets) misc, Test twice daily., Disp: , Rfl:     lisinopriL-hydrochlorothiazide 20-25 mg tablet, Take 1 tablet by mouth once daily., Disp: 90 tablet, Rfl: 3    OneTouch Ultra Test strip, Ultra Blue Strips: TEST BLOOD SUGAR TWICE A DAY, Disp: , Rfl:       SOCIAL HISTORY:  Patient  reports that he has never smoked. He has never used smokeless tobacco. He reports current alcohol use of about 2.0 standard drinks of alcohol per week.   Social History     Socioeconomic History    Marital status:      Spouse name: Not on file    Number of children: 3    Years of education: Not on file    Highest  education level: Not on file   Occupational History    Occupation: ,    Tobacco Use    Smoking status: Never    Smokeless tobacco: Never   Substance and Sexual Activity    Alcohol use: Yes     Alcohol/week: 2.0 standard drinks of alcohol     Types: 2 Shots of liquor per week    Drug use: Not on file    Sexual activity: Not on file   Other Topics Concern    Not on file   Social History Narrative    Not on file     Social Determinants of Health     Financial Resource Strain: Not on file   Food Insecurity: Not on file   Transportation Needs: Not on file   Physical Activity: Inactive (2023)    Exercise Vital Sign     Days of Exercise per Week: 0 days     Minutes of Exercise per Session: 0 min   Stress: Not on file   Social Connections: Not on file   Intimate Partner Violence: Not on file   Housing Stability: Not on file       FAMILY HISTORY:  Family History   Problem Relation Name Age of Onset    Diabetes Mother      Pneumonia Father      Diabetes Brother         REVIEW OF SYSTEMS:***  Constitutional: Negative for fever and chills. Denies anorexia, weight loss.  Eyes: Negative for visual disturbance.   Respiratory: Negative for shortness of breath.    Cardiovascular: Negative for chest pain.   Gastrointestinal: Negative for nausea and vomiting.   Genitourinary: See interval history above.  Skin: Negative for rash.   Neurological: Negative for dizziness and numbness.   Psychiatric/Behavioral: Negative for confusion and decreased concentration.     PHYSICAL EXAM:  There were no vitals taken for this visit.  Constitutional: Patient appears well-developed and well-nourished. No distress.    Head: Normocephalic and atraumatic.    Neck: Normal range of motion.    Cardiovascular: Normal rate.    Pulmonary/Chest: Effort normal. No respiratory distress.   Abdominal: ***  : ***  Musculoskeletal: Normal range of motion.    Neurological: Alert and oriented to person, place, and time.  Psychiatric:  Normal mood and affect. Behavior is normal. Thought content normal.      PATHOLOGY REVIEW:  ***    RADIOLOGY REVIEW:  [unfilled]    LABORATORY REVIEW:   [unfilled]    Lab Results   Component Value Date    BUN 15 09/22/2023    CREATININE 1.32 (H) 09/22/2023     09/22/2023    K 4.0 09/22/2023     09/22/2023    CO2 28 09/22/2023    CALCIUM 9.4 09/22/2023      Lab Results   Component Value Date    WBC 7.2 03/16/2023    RBC 5.72 03/16/2023    HGB 16.3 03/16/2023    HCT 50.1 03/16/2023    MCV 88 03/16/2023    MCHC 32.5 03/16/2023    RDW 13.5 03/16/2023     03/16/2023        Lab Results   Component Value Date    PSA 4.01 (H) 09/22/2023    PSA 4.01 (H) 03/16/2023    PSA 2.48 11/06/2020        *** Urine dipstick shows {ua dip:608288}.  Micro exam: {urine micro:691840}.       Assessment:    No diagnosis found.    Juan Antonio Araujo is a 68 y.o. male with ***     Plan:    ***    * _ minutes total spent on patient's care today; >50% time spent on counseling/coordination of care

## 2023-12-07 ENCOUNTER — TELEMEDICINE (OUTPATIENT)
Dept: UROLOGY | Facility: CLINIC | Age: 68
End: 2023-12-07
Payer: MEDICARE

## 2023-12-13 ENCOUNTER — APPOINTMENT (OUTPATIENT)
Dept: PHARMACY | Facility: HOSPITAL | Age: 68
End: 2023-12-13
Payer: MEDICARE

## 2023-12-22 ENCOUNTER — OFFICE VISIT (OUTPATIENT)
Dept: PRIMARY CARE | Facility: CLINIC | Age: 68
End: 2023-12-22
Payer: MEDICARE

## 2023-12-22 VITALS
DIASTOLIC BLOOD PRESSURE: 74 MMHG | BODY MASS INDEX: 36.44 KG/M2 | HEART RATE: 80 BPM | SYSTOLIC BLOOD PRESSURE: 122 MMHG | WEIGHT: 219 LBS

## 2023-12-22 DIAGNOSIS — E11.9 DIABETES MELLITUS TYPE 2 WITHOUT RETINOPATHY (MULTI): Primary | ICD-10-CM

## 2023-12-22 DIAGNOSIS — I10 BENIGN ESSENTIAL HYPERTENSION: ICD-10-CM

## 2023-12-22 DIAGNOSIS — M48.061 SPINAL STENOSIS OF LUMBAR REGION WITHOUT NEUROGENIC CLAUDICATION: ICD-10-CM

## 2023-12-22 DIAGNOSIS — E78.49 FAMILIAL COMBINED HYPERLIPIDEMIA: ICD-10-CM

## 2023-12-22 LAB
POC FINGERSTICK BLOOD GLUCOSE: 115 MG/DL (ref 70–100)
POC HEMOGLOBIN A1C: 8.5 % (ref 4.2–6.5)

## 2023-12-22 PROCEDURE — 1036F TOBACCO NON-USER: CPT | Performed by: INTERNAL MEDICINE

## 2023-12-22 PROCEDURE — 3078F DIAST BP <80 MM HG: CPT | Performed by: INTERNAL MEDICINE

## 2023-12-22 PROCEDURE — 83036 HEMOGLOBIN GLYCOSYLATED A1C: CPT | Performed by: INTERNAL MEDICINE

## 2023-12-22 PROCEDURE — 82962 GLUCOSE BLOOD TEST: CPT | Performed by: INTERNAL MEDICINE

## 2023-12-22 PROCEDURE — 3074F SYST BP LT 130 MM HG: CPT | Performed by: INTERNAL MEDICINE

## 2023-12-22 PROCEDURE — 1159F MED LIST DOCD IN RCRD: CPT | Performed by: INTERNAL MEDICINE

## 2023-12-22 PROCEDURE — 1160F RVW MEDS BY RX/DR IN RCRD: CPT | Performed by: INTERNAL MEDICINE

## 2023-12-22 PROCEDURE — 99213 OFFICE O/P EST LOW 20 MIN: CPT | Performed by: INTERNAL MEDICINE

## 2023-12-22 ASSESSMENT — ENCOUNTER SYMPTOMS
CHEST TIGHTNESS: 0
NAUSEA: 0
VOMITING: 0
HEADACHES: 0
FLANK PAIN: 0
NECK STIFFNESS: 0
FATIGUE: 0
RHINORRHEA: 0
COUGH: 0
FREQUENCY: 0
DIARRHEA: 0
COLOR CHANGE: 0
APPETITE CHANGE: 0
EYE REDNESS: 0
SINUS PRESSURE: 0
ANAL BLEEDING: 0
WEAKNESS: 0
SORE THROAT: 0
STRIDOR: 0
CHOKING: 0
SEIZURES: 0
FACIAL ASYMMETRY: 0
DIFFICULTY URINATING: 0
DIAPHORESIS: 0
PALPITATIONS: 0
NUMBNESS: 0
EYE ITCHING: 0
ADENOPATHY: 0
EYE DISCHARGE: 0
SPEECH DIFFICULTY: 0
ABDOMINAL PAIN: 0
SHORTNESS OF BREATH: 0
POLYPHAGIA: 0
BACK PAIN: 1
TROUBLE SWALLOWING: 0
EYE PAIN: 0
LIGHT-HEADEDNESS: 0
JOINT SWELLING: 0
PHOTOPHOBIA: 0
ARTHRALGIAS: 1
SLEEP DISTURBANCE: 0
BRUISES/BLEEDS EASILY: 0
NECK PAIN: 0
DIZZINESS: 0
FACIAL SWELLING: 0
CHILLS: 0
WHEEZING: 0
ACTIVITY CHANGE: 0
HEMATURIA: 0
POLYDIPSIA: 0
MYALGIAS: 0
RECTAL PAIN: 0
ABDOMINAL DISTENTION: 0
WOUND: 0
TREMORS: 0
SINUS PAIN: 0
DYSURIA: 0
BLOOD IN STOOL: 0
CONSTIPATION: 0
VOICE CHANGE: 0

## 2023-12-22 NOTE — PROGRESS NOTES
Subjective   Patient ID: Juan Antonio Araujo is a 68 y.o. male who presents for Follow-up (6 month fuv).    He presents for follow-up.  He has been compliant with his medications, diet and exercise.  He has purposely lost weight.  Reports that his sugars are in the low 100s at home.  He overall feels well.  Denies any headache, no dizziness, no chest pain or shortness of breath.  He denies abdominal pain no nausea vomiting or diarrhea.  Reports baseline knee and back pain.         Review of Systems   Constitutional:  Negative for activity change, appetite change, chills, diaphoresis and fatigue.   HENT:  Negative for congestion, dental problem, drooling, ear discharge, ear pain, facial swelling, hearing loss, mouth sores, nosebleeds, postnasal drip, rhinorrhea, sinus pressure, sinus pain, sneezing, sore throat, tinnitus, trouble swallowing and voice change.    Eyes:  Negative for photophobia, pain, discharge, redness, itching and visual disturbance.   Respiratory:  Negative for cough, choking, chest tightness, shortness of breath, wheezing and stridor.    Cardiovascular:  Negative for chest pain, palpitations and leg swelling.   Gastrointestinal:  Negative for abdominal distention, abdominal pain, anal bleeding, blood in stool, constipation, diarrhea, nausea, rectal pain and vomiting.   Endocrine: Negative for cold intolerance, heat intolerance, polydipsia, polyphagia and polyuria.   Genitourinary:  Negative for decreased urine volume, difficulty urinating, dysuria, enuresis, flank pain, frequency, genital sores, hematuria and urgency.   Musculoskeletal:  Positive for arthralgias and back pain. Negative for gait problem, joint swelling, myalgias, neck pain and neck stiffness.   Skin:  Negative for color change, pallor, rash and wound.   Neurological:  Negative for dizziness, tremors, seizures, syncope, facial asymmetry, speech difficulty, weakness, light-headedness, numbness and headaches.   Hematological:  Negative  for adenopathy. Does not bruise/bleed easily.   Psychiatric/Behavioral:  Negative for sleep disturbance.        Objective   /74   Pulse 80   Wt 99.3 kg (219 lb)   BMI 36.44 kg/m²     Physical Exam  Constitutional:       Appearance: Normal appearance.   Cardiovascular:      Rate and Rhythm: Normal rate and regular rhythm.      Heart sounds: No murmur heard.     No gallop.   Pulmonary:      Effort: No respiratory distress.      Breath sounds: No wheezing or rales.   Abdominal:      General: There is no distension.      Palpations: There is no mass.      Tenderness: There is no abdominal tenderness. There is no guarding.   Musculoskeletal:      Right lower leg: No edema.      Left lower leg: No edema.   Neurological:      Mental Status: He is alert.         Assessment/Plan   Diagnoses and all orders for this visit:  Diabetes mellitus type 2 without retinopathy (CMS/[facility])-hemoglobin A1c was 8.5.  He recently changed his diet and lost weight.  He does not want any medication adjustments.  Will recheck in 3 months.  Risk of poor diabetic control explained.  Ophthalmology appointment has been done  -     POCT glycosylated hemoglobin (Hb A1C) manually resulted  -     POCT Fingerstick Glucose manually resulted  Benign essential hypertension-stable on present medications  Familial combined hyperlipidemia-diet and exercise  Spinal stenosis of lumbar region without neurogenic claudication-symptoms have been stable.  Health maintenance-colonoscopy has been done.  He will get the RSV shingles vaccine at the pharmacy.  Will also get a tetanus shot.  Obesity-congratulated on his weight loss.  Continue with diet and exercise

## 2023-12-27 ENCOUNTER — APPOINTMENT (OUTPATIENT)
Dept: PHARMACY | Facility: HOSPITAL | Age: 68
End: 2023-12-27
Payer: MEDICARE

## 2024-04-01 ENCOUNTER — OFFICE VISIT (OUTPATIENT)
Dept: PRIMARY CARE | Facility: CLINIC | Age: 69
End: 2024-04-01
Payer: MEDICARE

## 2024-04-01 ENCOUNTER — LAB (OUTPATIENT)
Dept: LAB | Facility: LAB | Age: 69
End: 2024-04-01
Payer: MEDICARE

## 2024-04-01 VITALS
WEIGHT: 218 LBS | BODY MASS INDEX: 36.32 KG/M2 | SYSTOLIC BLOOD PRESSURE: 110 MMHG | HEIGHT: 65 IN | DIASTOLIC BLOOD PRESSURE: 76 MMHG | HEART RATE: 72 BPM

## 2024-04-01 DIAGNOSIS — E66.01 CLASS 2 SEVERE OBESITY DUE TO EXCESS CALORIES WITH SERIOUS COMORBIDITY AND BODY MASS INDEX (BMI) OF 36.0 TO 36.9 IN ADULT (MULTI): ICD-10-CM

## 2024-04-01 DIAGNOSIS — I10 BENIGN ESSENTIAL HYPERTENSION: ICD-10-CM

## 2024-04-01 DIAGNOSIS — R53.81 MALAISE: ICD-10-CM

## 2024-04-01 DIAGNOSIS — E11.9 DIABETES MELLITUS TYPE 2 WITHOUT RETINOPATHY (MULTI): Primary | ICD-10-CM

## 2024-04-01 DIAGNOSIS — E66.01 OBESITY, MORBID (MULTI): ICD-10-CM

## 2024-04-01 DIAGNOSIS — Z12.5 SCREENING PSA (PROSTATE SPECIFIC ANTIGEN): ICD-10-CM

## 2024-04-01 DIAGNOSIS — E55.9 VITAMIN D DEFICIENCY: ICD-10-CM

## 2024-04-01 DIAGNOSIS — E78.49 FAMILIAL COMBINED HYPERLIPIDEMIA: ICD-10-CM

## 2024-04-01 LAB
25(OH)D3 SERPL-MCNC: 28 NG/ML (ref 30–100)
ALBUMIN SERPL BCP-MCNC: 4.2 G/DL (ref 3.4–5)
ALP SERPL-CCNC: 116 U/L (ref 33–136)
ALT SERPL W P-5'-P-CCNC: 28 U/L (ref 10–52)
ANION GAP SERPL CALC-SCNC: 14 MMOL/L (ref 10–20)
APPEARANCE UR: CLEAR
AST SERPL W P-5'-P-CCNC: 53 U/L (ref 9–39)
BASOPHILS # BLD AUTO: 0.1 X10*3/UL (ref 0–0.1)
BASOPHILS NFR BLD AUTO: 1.2 %
BILIRUB SERPL-MCNC: 0.5 MG/DL (ref 0–1.2)
BILIRUB UR STRIP.AUTO-MCNC: NEGATIVE MG/DL
BUN SERPL-MCNC: 19 MG/DL (ref 6–23)
CALCIUM SERPL-MCNC: 9.6 MG/DL (ref 8.6–10.6)
CHLORIDE SERPL-SCNC: 100 MMOL/L (ref 98–107)
CHOLEST SERPL-MCNC: 159 MG/DL (ref 0–199)
CHOLESTEROL/HDL RATIO: 5.3
CO2 SERPL-SCNC: 29 MMOL/L (ref 21–32)
COLOR UR: ABNORMAL
CREAT SERPL-MCNC: 1.42 MG/DL (ref 0.5–1.3)
CREAT UR-MCNC: 102.2 MG/DL (ref 20–370)
EGFRCR SERPLBLD CKD-EPI 2021: 53 ML/MIN/1.73M*2
EOSINOPHIL # BLD AUTO: 0.14 X10*3/UL (ref 0–0.7)
EOSINOPHIL NFR BLD AUTO: 1.7 %
ERYTHROCYTE [DISTWIDTH] IN BLOOD BY AUTOMATED COUNT: 13.5 % (ref 11.5–14.5)
GLUCOSE SERPL-MCNC: 138 MG/DL (ref 74–99)
GLUCOSE UR STRIP.AUTO-MCNC: ABNORMAL MG/DL
HCT VFR BLD AUTO: 52.3 % (ref 41–52)
HDLC SERPL-MCNC: 29.8 MG/DL
HGB BLD-MCNC: 16.8 G/DL (ref 13.5–17.5)
IMM GRANULOCYTES # BLD AUTO: 0.04 X10*3/UL (ref 0–0.7)
IMM GRANULOCYTES NFR BLD AUTO: 0.5 % (ref 0–0.9)
KETONES UR STRIP.AUTO-MCNC: NEGATIVE MG/DL
LDLC SERPL CALC-MCNC: 51 MG/DL
LEUKOCYTE ESTERASE UR QL STRIP.AUTO: NEGATIVE
LYMPHOCYTES # BLD AUTO: 2.63 X10*3/UL (ref 1.2–4.8)
LYMPHOCYTES NFR BLD AUTO: 32.8 %
MCH RBC QN AUTO: 27.9 PG (ref 26–34)
MCHC RBC AUTO-ENTMCNC: 32.1 G/DL (ref 32–36)
MCV RBC AUTO: 87 FL (ref 80–100)
MICROALBUMIN UR-MCNC: 20.9 MG/L
MICROALBUMIN/CREAT UR: 20.5 UG/MG CREAT
MONOCYTES # BLD AUTO: 0.68 X10*3/UL (ref 0.1–1)
MONOCYTES NFR BLD AUTO: 8.5 %
NEUTROPHILS # BLD AUTO: 4.44 X10*3/UL (ref 1.2–7.7)
NEUTROPHILS NFR BLD AUTO: 55.3 %
NITRITE UR QL STRIP.AUTO: NEGATIVE
NON HDL CHOLESTEROL: 129 MG/DL (ref 0–149)
NRBC BLD-RTO: 0 /100 WBCS (ref 0–0)
PH UR STRIP.AUTO: 5 [PH]
PLATELET # BLD AUTO: 149 X10*3/UL (ref 150–450)
POC HEMOGLOBIN A1C: 8 % (ref 4.2–6.5)
POTASSIUM SERPL-SCNC: 3.7 MMOL/L (ref 3.5–5.3)
PROT SERPL-MCNC: 7.1 G/DL (ref 6.4–8.2)
PROT UR STRIP.AUTO-MCNC: NEGATIVE MG/DL
PSA SERPL-MCNC: 6.28 NG/ML
RBC # BLD AUTO: 6.03 X10*6/UL (ref 4.5–5.9)
RBC # UR STRIP.AUTO: NEGATIVE /UL
SODIUM SERPL-SCNC: 139 MMOL/L (ref 136–145)
SP GR UR STRIP.AUTO: 1.02
TRIGL SERPL-MCNC: 393 MG/DL (ref 0–149)
TSH SERPL-ACNC: 2.2 MIU/L (ref 0.44–3.98)
URATE SERPL-MCNC: 7.6 MG/DL (ref 4–7.5)
UROBILINOGEN UR STRIP.AUTO-MCNC: NORMAL MG/DL
VLDL: 79 MG/DL (ref 0–40)
WBC # BLD AUTO: 8 X10*3/UL (ref 4.4–11.3)

## 2024-04-01 PROCEDURE — 1159F MED LIST DOCD IN RCRD: CPT | Performed by: INTERNAL MEDICINE

## 2024-04-01 PROCEDURE — 3048F LDL-C <100 MG/DL: CPT | Performed by: INTERNAL MEDICINE

## 2024-04-01 PROCEDURE — 81003 URINALYSIS AUTO W/O SCOPE: CPT

## 2024-04-01 PROCEDURE — G0103 PSA SCREENING: HCPCS

## 2024-04-01 PROCEDURE — G0439 PPPS, SUBSEQ VISIT: HCPCS | Performed by: INTERNAL MEDICINE

## 2024-04-01 PROCEDURE — 85025 COMPLETE CBC W/AUTO DIFF WBC: CPT

## 2024-04-01 PROCEDURE — 80053 COMPREHEN METABOLIC PANEL: CPT

## 2024-04-01 PROCEDURE — 84550 ASSAY OF BLOOD/URIC ACID: CPT

## 2024-04-01 PROCEDURE — 82570 ASSAY OF URINE CREATININE: CPT

## 2024-04-01 PROCEDURE — G0009 ADMIN PNEUMOCOCCAL VACCINE: HCPCS | Performed by: INTERNAL MEDICINE

## 2024-04-01 PROCEDURE — 1036F TOBACCO NON-USER: CPT | Performed by: INTERNAL MEDICINE

## 2024-04-01 PROCEDURE — 82043 UR ALBUMIN QUANTITATIVE: CPT

## 2024-04-01 PROCEDURE — 80061 LIPID PANEL: CPT

## 2024-04-01 PROCEDURE — 82306 VITAMIN D 25 HYDROXY: CPT

## 2024-04-01 PROCEDURE — 3008F BODY MASS INDEX DOCD: CPT | Performed by: INTERNAL MEDICINE

## 2024-04-01 PROCEDURE — 3061F NEG MICROALBUMINURIA REV: CPT | Performed by: INTERNAL MEDICINE

## 2024-04-01 PROCEDURE — 84443 ASSAY THYROID STIM HORMONE: CPT

## 2024-04-01 PROCEDURE — 90677 PCV20 VACCINE IM: CPT | Performed by: INTERNAL MEDICINE

## 2024-04-01 PROCEDURE — 36415 COLL VENOUS BLD VENIPUNCTURE: CPT

## 2024-04-01 PROCEDURE — 1170F FXNL STATUS ASSESSED: CPT | Performed by: INTERNAL MEDICINE

## 2024-04-01 PROCEDURE — 3074F SYST BP LT 130 MM HG: CPT | Performed by: INTERNAL MEDICINE

## 2024-04-01 PROCEDURE — 3078F DIAST BP <80 MM HG: CPT | Performed by: INTERNAL MEDICINE

## 2024-04-01 PROCEDURE — 1160F RVW MEDS BY RX/DR IN RCRD: CPT | Performed by: INTERNAL MEDICINE

## 2024-04-01 PROCEDURE — 83036 HEMOGLOBIN GLYCOSYLATED A1C: CPT | Performed by: INTERNAL MEDICINE

## 2024-04-01 PROCEDURE — 1124F ACP DISCUSS-NO DSCNMKR DOCD: CPT | Performed by: INTERNAL MEDICINE

## 2024-04-01 PROCEDURE — 99214 OFFICE O/P EST MOD 30 MIN: CPT | Performed by: INTERNAL MEDICINE

## 2024-04-01 ASSESSMENT — ENCOUNTER SYMPTOMS
SHORTNESS OF BREATH: 0
COLOR CHANGE: 0
BRUISES/BLEEDS EASILY: 0
ABDOMINAL DISTENTION: 0
EYE ITCHING: 0
CONSTIPATION: 0
COUGH: 0
BLOOD IN STOOL: 0
NAUSEA: 0
SINUS PAIN: 0
NUMBNESS: 0
CHILLS: 0
SORE THROAT: 0
EYE DISCHARGE: 0
SPEECH DIFFICULTY: 0
DIARRHEA: 0
TREMORS: 0
SLEEP DISTURBANCE: 0
SEIZURES: 0
CHEST TIGHTNESS: 0
CHOKING: 0
POLYPHAGIA: 0
PALPITATIONS: 0
DIFFICULTY URINATING: 0
WEAKNESS: 0
ACTIVITY CHANGE: 0
PHOTOPHOBIA: 0
STRIDOR: 0
MYALGIAS: 0
TROUBLE SWALLOWING: 0
EYE REDNESS: 0
DIAPHORESIS: 0
FACIAL ASYMMETRY: 0
FLANK PAIN: 0
ARTHRALGIAS: 0
JOINT SWELLING: 0
VOICE CHANGE: 0
FATIGUE: 0
HEMATURIA: 0
WOUND: 0
FREQUENCY: 0
RECTAL PAIN: 0
LIGHT-HEADEDNESS: 0
FACIAL SWELLING: 0
POLYDIPSIA: 0
WHEEZING: 0
ANAL BLEEDING: 0
BACK PAIN: 0
NECK STIFFNESS: 0
DIZZINESS: 0
ADENOPATHY: 0
DYSURIA: 0
ABDOMINAL PAIN: 0
SINUS PRESSURE: 0
EYE PAIN: 0
NECK PAIN: 0
VOMITING: 0
RHINORRHEA: 0
HEADACHES: 0
APPETITE CHANGE: 0

## 2024-04-01 ASSESSMENT — ACTIVITIES OF DAILY LIVING (ADL)
DRESSING: INDEPENDENT
TAKING_MEDICATION: INDEPENDENT
BATHING: INDEPENDENT
MANAGING_FINANCES: INDEPENDENT
DOING_HOUSEWORK: INDEPENDENT
GROCERY_SHOPPING: INDEPENDENT

## 2024-04-01 ASSESSMENT — PATIENT HEALTH QUESTIONNAIRE - PHQ9
2. FEELING DOWN, DEPRESSED OR HOPELESS: NOT AT ALL
1. LITTLE INTEREST OR PLEASURE IN DOING THINGS: NOT AT ALL
SUM OF ALL RESPONSES TO PHQ9 QUESTIONS 1 AND 2: 0

## 2024-04-01 NOTE — PROGRESS NOTES
Do not Subjective   Patient ID: Juan Antonio Araujo is a 68 y.o. male who presents for Follow-up and Medicare Annual Wellness Visit Subsequent.    Patient presents for wellness exam and follow-up.  He has been compliant with his medications, diet but not exercise.  He overall feels well..  He denies any headaches, no dizziness, no sinus problems, no chest pain or shortness of breath.  He denies abdominal pain no nausea vomiting or diarrhea.  He reports no new musculoskeletal complaints.         Review of Systems   Constitutional:  Negative for activity change, appetite change, chills, diaphoresis and fatigue.   HENT:  Negative for congestion, dental problem, drooling, ear discharge, ear pain, facial swelling, hearing loss, mouth sores, nosebleeds, postnasal drip, rhinorrhea, sinus pressure, sinus pain, sneezing, sore throat, tinnitus, trouble swallowing and voice change.    Eyes:  Negative for photophobia, pain, discharge, redness, itching and visual disturbance.   Respiratory:  Negative for cough, choking, chest tightness, shortness of breath, wheezing and stridor.    Cardiovascular:  Negative for chest pain, palpitations and leg swelling.   Gastrointestinal:  Negative for abdominal distention, abdominal pain, anal bleeding, blood in stool, constipation, diarrhea, nausea, rectal pain and vomiting.   Endocrine: Negative for cold intolerance, heat intolerance, polydipsia, polyphagia and polyuria.   Genitourinary:  Negative for decreased urine volume, difficulty urinating, dysuria, enuresis, flank pain, frequency, genital sores, hematuria and urgency.   Musculoskeletal:  Negative for arthralgias, back pain, gait problem, joint swelling, myalgias, neck pain and neck stiffness.   Skin:  Negative for color change, pallor, rash and wound.   Neurological:  Negative for dizziness, tremors, seizures, syncope, facial asymmetry, speech difficulty, weakness, light-headedness, numbness and headaches.   Hematological:  Negative for  adenopathy. Does not bruise/bleed easily.   Psychiatric/Behavioral:  Negative for sleep disturbance.        Objective   /76   Pulse 72   Wt 98.9 kg (218 lb)   BMI 36.28 kg/m²     Physical Exam  Constitutional:       Appearance: Normal appearance.   Cardiovascular:      Rate and Rhythm: Normal rate and regular rhythm.      Heart sounds: No murmur heard.     No gallop.   Pulmonary:      Effort: No respiratory distress.      Breath sounds: No wheezing or rales.   Abdominal:      General: There is no distension.      Palpations: There is no mass.      Tenderness: There is no abdominal tenderness. There is no guarding.   Musculoskeletal:      Right lower leg: No edema.      Left lower leg: No edema.   Neurological:      Mental Status: He is alert.         Assessment/Plan   Diagnoses and all orders for this visit:  Benign essential hypertension-low-salt diet and exercise  -     Albumin , Urine Random; Future  -     Comprehensive Metabolic Panel; Future  -     Urinalysis with Reflex Microscopic; Future  Vitamin D deficiency  -     Vitamin D 25-Hydroxy,Total (for eval of Vitamin D levels); Future  Familial combined hyperlipidemia-check a fasting lipid profile  -     Lipid Panel; Future  Malaise  -     CBC and Auto Differential; Future  -     TSH with reflex to Free T4 if abnormal; Future  -     Uric Acid; Future  Screening PSA (prostate specific antigen)  -     Prostate Specific Antigen; Future  Obesity, morbid (CMS/[facility])  Diabetes mellitus type 2 without retinopathy (CMS/[facility])-hemoglobin A1c was 8.0.  Will start Ozempic.  Refer to pharmacy for further evaluation.  There is no family history of thyroid cancer or multiple endocrine neoplasia.  No history of pancreatitis  -     semaglutide 0.25 mg or 0.5 mg (2 mg/3 mL) pen injector; Inject 0.25 mg under the skin 1 (one) time per week.  Class 2 severe obesity due to excess calories with serious comorbidity and body mass index (BMI) of 36.0 to 36.9 in adult (CMS/HCC)-he  is encouraged to diet and exercise  Other orders  -     Pneumococcal conjugate vaccine, 20-valent (PREVNAR 20)  Health maintenance-colonoscopy has been done.  Will give the shingles vaccine.  Advance care planning discussed.  Eye and dental have been done.  Elevated PSA-we will recheck today.  Will reschedule with urology

## 2024-04-01 NOTE — PATIENT INSTRUCTIONS
Please obtain fasting blood work and urine.  Diet and exercise.  Follow-up in 3 months.  You will be notified about a pharmacy team appointment.

## 2024-05-10 ENCOUNTER — HOSPITAL ENCOUNTER (OUTPATIENT)
Dept: RADIOLOGY | Facility: HOSPITAL | Age: 69
Discharge: HOME | End: 2024-05-10
Payer: MEDICARE

## 2024-05-10 ENCOUNTER — OFFICE VISIT (OUTPATIENT)
Dept: ORTHOPEDIC SURGERY | Facility: HOSPITAL | Age: 69
End: 2024-05-10
Payer: MEDICARE

## 2024-05-10 DIAGNOSIS — M25.561 ACUTE PAIN OF RIGHT KNEE: ICD-10-CM

## 2024-05-10 DIAGNOSIS — S86.911A KNEE STRAIN, RIGHT, INITIAL ENCOUNTER: Primary | ICD-10-CM

## 2024-05-10 DIAGNOSIS — M25.561 RIGHT KNEE PAIN, UNSPECIFIED CHRONICITY: ICD-10-CM

## 2024-05-10 PROCEDURE — 1160F RVW MEDS BY RX/DR IN RCRD: CPT | Performed by: PHYSICIAN ASSISTANT

## 2024-05-10 PROCEDURE — 99202 OFFICE O/P NEW SF 15 MIN: CPT | Performed by: PHYSICIAN ASSISTANT

## 2024-05-10 PROCEDURE — 1159F MED LIST DOCD IN RCRD: CPT | Performed by: PHYSICIAN ASSISTANT

## 2024-05-10 PROCEDURE — 3048F LDL-C <100 MG/DL: CPT | Performed by: PHYSICIAN ASSISTANT

## 2024-05-10 PROCEDURE — A4467 BELT STRAP SLEEV GRMNT COVER: HCPCS | Performed by: PHYSICIAN ASSISTANT

## 2024-05-10 PROCEDURE — 3061F NEG MICROALBUMINURIA REV: CPT | Performed by: PHYSICIAN ASSISTANT

## 2024-05-10 PROCEDURE — 99212 OFFICE O/P EST SF 10 MIN: CPT | Performed by: PHYSICIAN ASSISTANT

## 2024-05-10 PROCEDURE — 73564 X-RAY EXAM KNEE 4 OR MORE: CPT | Mod: RT

## 2024-05-10 PROCEDURE — 1125F AMNT PAIN NOTED PAIN PRSNT: CPT | Performed by: PHYSICIAN ASSISTANT

## 2024-05-10 PROCEDURE — 3008F BODY MASS INDEX DOCD: CPT | Performed by: PHYSICIAN ASSISTANT

## 2024-05-10 PROCEDURE — 73564 X-RAY EXAM KNEE 4 OR MORE: CPT | Mod: RIGHT SIDE | Performed by: RADIOLOGY

## 2024-05-10 RX ORDER — MELOXICAM 15 MG/1
15 TABLET ORAL DAILY
Qty: 90 TABLET | Refills: 1 | Status: SHIPPED | OUTPATIENT
Start: 2024-05-10

## 2024-05-10 ASSESSMENT — PAIN SCALES - GENERAL: PAINLEVEL_OUTOF10: 8

## 2024-05-10 ASSESSMENT — PAIN - FUNCTIONAL ASSESSMENT: PAIN_FUNCTIONAL_ASSESSMENT: 0-10

## 2024-05-17 NOTE — PROGRESS NOTES
"HPI:  Juan Antonio Araujo is a 68 y.o. male who presents to the orthopedic injury clinic for evaluation of Right knee pain.     DOI Thursday 05/02/24. He was attempting to jump up onto the tailgate of his truck when he struck his right knee and then subsequently \"landed funny\" on the right leg.     Reports pain and swelling anterior & posterior with pain increased with weight bearing and knee flexion. He is ambulating well without assistive devices. He has not taken any medication or used ice/heat.    His pain is slowly improving however he has some persistent swelling and wanted to be evaluated.     ROS:  Reviewed on EMR and patient intake sheet.    PMH/SH:  Reviewed on EMR and patient intake sheet.    Exam:  Physical Exam  Knee Musculoskeletal Exam  Gait    Antalgic: right    Ambulation assistive devices: none.    Inspection    Right      Erythema: none        Effusion: none        Edema: mild        Ecchymosis: none        Deformity: none        Alignment: normal        Previous incision: no previous incision    Palpation    Right      Increased warmth: none      Masses: none        Crepitus: none        Tenderness: none      Range of Motion    Right      Right knee range of motion is full.        Right knee passive flexion: pain with knee flexion past 100 degrees.    Strength    Right      Right knee strength is normal.      Instability    Right      Instability signs: none - stable    Neurovascular    Right      Right knee neurovascular exam is normal.      Special Signs    Right      Straight leg raise: normal      Patellar compression: none        Patellar apprehension: none      General      Constitutional: appears stated age and moderately obese    Psychiatric: normal mood and affect and no acute distress    Neurological: alert and oriented x3    Skin: intact        Radiology:     Xrays right knee done in the office today 05/10/24 personally reviewed. No fracture or dislocation. No knee effusion. Mild " tricompartmental degenerative changes.       Assessment and Plan:  1. Acute pain of right knee  - XR knee right 4+ views; Future    2. Knee strain, right, initial encounter  - meloxicam (Mobic) 15 mg tablet; Take 1 tablet (15 mg) by mouth once daily.  Dispense: 90 tablet; Refill: 1  - Knee brace    I reviewed the xrays with Juan Antonio today. I suspect a knee sprain and he was provided a script for Meloxicam and Knee sleeve brcae today for stability.     Recommend light activity until pain improves. He should elevate and ice as needed.     Follow up information provided for Silviano Barajas PA-C if symptoms fail to improve.     Patient in agreement to plan of care. Of course Juan Antonio Mggermaine is welcome to call at anytime with questions or concerns.     Patient was prescribed a right knee sleeve for knee instability/sprain. The patient is ambulatory with or without aid; but, has weakness, instability and/or deformity of their right knee which requires stabilization from this orthosis to improve their function.      Verbal and written instructions for the use, wear schedule, cleaning and application of this item were given.  Patient was instructed that should the brace result in increased pain, decreased sensation, increased swelling, or an overall worsening of their medical condition, to please contact our office immediately.     Orthotic management and training was provided for skin care, modifications due to healing tissues, edema changes, interruption in skin integrity, and safety precautions with the orthosis.      Zarina Bee PA-C  Department of Orthopaedic Surgery    [address] [place] [address]    Voicemail: [phone]   Appts: [phone]  Fax: [phone]

## 2024-07-03 ENCOUNTER — APPOINTMENT (OUTPATIENT)
Dept: PRIMARY CARE | Facility: CLINIC | Age: 69
End: 2024-07-03
Payer: MEDICARE

## 2024-07-08 ENCOUNTER — APPOINTMENT (OUTPATIENT)
Dept: PRIMARY CARE | Facility: CLINIC | Age: 69
End: 2024-07-08
Payer: MEDICARE

## 2024-07-08 VITALS
BODY MASS INDEX: 36.78 KG/M2 | WEIGHT: 221 LBS | SYSTOLIC BLOOD PRESSURE: 130 MMHG | HEART RATE: 88 BPM | DIASTOLIC BLOOD PRESSURE: 80 MMHG

## 2024-07-08 DIAGNOSIS — I10 BENIGN ESSENTIAL HYPERTENSION: ICD-10-CM

## 2024-07-08 DIAGNOSIS — M25.569 ARTHRALGIA OF KNEE, UNSPECIFIED LATERALITY: ICD-10-CM

## 2024-07-08 DIAGNOSIS — E78.49 FAMILIAL COMBINED HYPERLIPIDEMIA: Primary | ICD-10-CM

## 2024-07-08 DIAGNOSIS — E11.9 DIABETES MELLITUS TYPE 2 WITHOUT RETINOPATHY (MULTI): ICD-10-CM

## 2024-07-08 DIAGNOSIS — M48.061 SPINAL STENOSIS OF LUMBAR REGION WITHOUT NEUROGENIC CLAUDICATION: ICD-10-CM

## 2024-07-08 DIAGNOSIS — N18.31 STAGE 3A CHRONIC KIDNEY DISEASE (MULTI): ICD-10-CM

## 2024-07-08 LAB
POC FINGERSTICK BLOOD GLUCOSE: 133 MG/DL (ref 70–100)
POC HEMOGLOBIN A1C: 8 % (ref 4.2–6.5)

## 2024-07-08 PROCEDURE — 1159F MED LIST DOCD IN RCRD: CPT | Performed by: INTERNAL MEDICINE

## 2024-07-08 PROCEDURE — 83036 HEMOGLOBIN GLYCOSYLATED A1C: CPT | Performed by: INTERNAL MEDICINE

## 2024-07-08 PROCEDURE — 82962 GLUCOSE BLOOD TEST: CPT | Performed by: INTERNAL MEDICINE

## 2024-07-08 PROCEDURE — 3075F SYST BP GE 130 - 139MM HG: CPT | Performed by: INTERNAL MEDICINE

## 2024-07-08 PROCEDURE — 1036F TOBACCO NON-USER: CPT | Performed by: INTERNAL MEDICINE

## 2024-07-08 PROCEDURE — 3048F LDL-C <100 MG/DL: CPT | Performed by: INTERNAL MEDICINE

## 2024-07-08 PROCEDURE — 3079F DIAST BP 80-89 MM HG: CPT | Performed by: INTERNAL MEDICINE

## 2024-07-08 PROCEDURE — 1160F RVW MEDS BY RX/DR IN RCRD: CPT | Performed by: INTERNAL MEDICINE

## 2024-07-08 PROCEDURE — 3008F BODY MASS INDEX DOCD: CPT | Performed by: INTERNAL MEDICINE

## 2024-07-08 PROCEDURE — G2211 COMPLEX E/M VISIT ADD ON: HCPCS | Performed by: INTERNAL MEDICINE

## 2024-07-08 PROCEDURE — 99213 OFFICE O/P EST LOW 20 MIN: CPT | Performed by: INTERNAL MEDICINE

## 2024-07-08 PROCEDURE — 3061F NEG MICROALBUMINURIA REV: CPT | Performed by: INTERNAL MEDICINE

## 2024-07-08 PROCEDURE — 1124F ACP DISCUSS-NO DSCNMKR DOCD: CPT | Performed by: INTERNAL MEDICINE

## 2024-07-08 ASSESSMENT — ENCOUNTER SYMPTOMS
CHILLS: 0
ANAL BLEEDING: 0
DIARRHEA: 0
MYALGIAS: 0
WOUND: 0
FLANK PAIN: 0
EYE DISCHARGE: 0
ABDOMINAL PAIN: 0
NECK PAIN: 0
FACIAL SWELLING: 0
PALPITATIONS: 0
DIAPHORESIS: 0
ACTIVITY CHANGE: 0
POLYPHAGIA: 0
FREQUENCY: 0
EYE PAIN: 0
RHINORRHEA: 0
TREMORS: 0
PHOTOPHOBIA: 0
ADENOPATHY: 0
ABDOMINAL DISTENTION: 0
STRIDOR: 0
CONSTIPATION: 0
BACK PAIN: 0
COLOR CHANGE: 0
VOMITING: 0
JOINT SWELLING: 0
CHEST TIGHTNESS: 0
COUGH: 0
SINUS PRESSURE: 0
SLEEP DISTURBANCE: 0
EYE ITCHING: 0
EYE REDNESS: 0
SEIZURES: 0
SPEECH DIFFICULTY: 0
POLYDIPSIA: 0
DIFFICULTY URINATING: 0
ARTHRALGIAS: 0
VOICE CHANGE: 0
WEAKNESS: 0
LIGHT-HEADEDNESS: 0
NAUSEA: 0
CHOKING: 0
DYSURIA: 0
NECK STIFFNESS: 0
SHORTNESS OF BREATH: 0
FATIGUE: 0
NUMBNESS: 0
BLOOD IN STOOL: 0
TROUBLE SWALLOWING: 0
WHEEZING: 0
HEMATURIA: 0
BRUISES/BLEEDS EASILY: 0
DIZZINESS: 0
SORE THROAT: 0
FACIAL ASYMMETRY: 0
SINUS PAIN: 0
RECTAL PAIN: 0
APPETITE CHANGE: 0
HEADACHES: 0

## 2024-07-08 NOTE — PROGRESS NOTES
Subjective   Patient ID: Juan Antonio Araujo is a 68 y.o. male who presents for Follow-up.    Patient presents for follow-up.  He has been compliant with his medications, diet and exercise.  He reports that his sugars have been okay at home.  They have been in the low to mid 100s.  He denies any headaches, no dizziness, no chest pain or shortness of breath.  He denies abdominal pain no nausea vomiting or diarrhea.  He reports improvement in his back and knee pain.         Review of Systems   Constitutional:  Negative for activity change, appetite change, chills, diaphoresis and fatigue.   HENT:  Negative for congestion, dental problem, drooling, ear discharge, ear pain, facial swelling, hearing loss, mouth sores, nosebleeds, postnasal drip, rhinorrhea, sinus pressure, sinus pain, sneezing, sore throat, tinnitus, trouble swallowing and voice change.    Eyes:  Negative for photophobia, pain, discharge, redness, itching and visual disturbance.   Respiratory:  Negative for cough, choking, chest tightness, shortness of breath, wheezing and stridor.    Cardiovascular:  Negative for chest pain, palpitations and leg swelling.   Gastrointestinal:  Negative for abdominal distention, abdominal pain, anal bleeding, blood in stool, constipation, diarrhea, nausea, rectal pain and vomiting.   Endocrine: Negative for cold intolerance, heat intolerance, polydipsia, polyphagia and polyuria.   Genitourinary:  Negative for decreased urine volume, difficulty urinating, dysuria, enuresis, flank pain, frequency, genital sores, hematuria and urgency.   Musculoskeletal:  Negative for arthralgias, back pain, gait problem, joint swelling, myalgias, neck pain and neck stiffness.   Skin:  Negative for color change, pallor, rash and wound.   Neurological:  Negative for dizziness, tremors, seizures, syncope, facial asymmetry, speech difficulty, weakness, light-headedness, numbness and headaches.   Hematological:  Negative for adenopathy. Does not  bruise/bleed easily.   Psychiatric/Behavioral:  Negative for sleep disturbance.        Objective   /80   Pulse 88   Wt 100 kg (221 lb)   BMI 36.78 kg/m²     Physical Exam  Constitutional:       Appearance: Normal appearance.   Cardiovascular:      Rate and Rhythm: Normal rate and regular rhythm.      Heart sounds: No murmur heard.     No gallop.   Pulmonary:      Effort: No respiratory distress.      Breath sounds: No wheezing or rales.   Abdominal:      General: There is no distension.      Palpations: There is no mass.      Tenderness: There is no abdominal tenderness. There is no guarding.   Musculoskeletal:      Right lower leg: No edema.      Left lower leg: No edema.   Neurological:      Mental Status: He is alert.         Assessment/Plan   Diagnoses and all orders for this visit:  Familial combined hyperlipidemia-we will continue with statin  Diabetes mellitus type 2 without retinopathy (Multi)--hemoglobin A1c was 8 point he will start Ozempic.  Refer back to pharmacy team.  Ophthalmology appointment has been done  -     POCT glycosylated hemoglobin (Hb A1C) manually resulted  -     POCT Fingerstick Glucose manually resulted  -     semaglutide 0.25 mg or 0.5 mg (2 mg/3 mL) pen injector; Inject 0.25 mg under the skin 1 (one) time per week.  -     Follow Up In [facility]; Future  Stage 3a chronic kidney disease (Multi)-recheck creatinine next visit  Benign essential hypertension-stable on present medications  Arthralgia of knee, unspecified laterality-stable symptoms  Spinal stenosis of lumbar region without neurogenic claudication-stable symptoms.  Health maintenance-colonoscopy has been done.  Refuses immunizations.  Eye and dental have been done.

## 2024-07-08 NOTE — PROGRESS NOTES
Subjective   Patient ID: Juan Antonio Araujo is a 68 y.o. male who presents for No chief complaint on file..    HPI     Review of Systems    Objective   There were no vitals taken for this visit.    Physical Exam    Assessment/Plan

## 2024-08-13 PROBLEM — R97.20 ELEVATED PSA: Status: ACTIVE | Noted: 2024-08-13

## 2024-08-13 NOTE — PROGRESS NOTES
PROBLEM LIST:  1. Elevated PSA  Measure post void residual    PSA    MR prostate with snow boundaries if pirads 3 or above      2. Hyperplasia of prostate with lower urinary tract symptoms (LUTS)          HISTORY OF PRESENT ILLNESS:   Juan Antonio Araujo is a  68 year old male  with history of elevated PSA  presents today as a new patient for evaluation of PSA, kindly referred by Dr. Gracia. Denies any family history of cancer. Denies any family history of prostate cancer. Denies hematuria, dysuria, nocturia, flank pain, and bothersome frequency or urgency. Denies pushing or straining to void and states he feels he empties his bladder when voiding.  Post-void residual today is 208  cc. Urinalysis is negative for urinary tract infection and hematuria.    PAST MEDICAL HISTORY:  PSA Trend:  4/1/2024:6.28  9/22/2023: 4.01  3/16/2023: 4.01  11/6/2020: 2.48    Past Medical History:   Diagnosis Date    Essential (primary) hypertension 09/24/2013    Benign essential hypertension    Lower abdominal pain, unspecified     Groin pain    Personal history of other endocrine, nutritional and metabolic disease     History of hyperlipidemia       PAST SURGICAL HISTORY:  Past Surgical History:   Procedure Laterality Date    COLONOSCOPY  05/06/2016    Complete Colonoscopy    COLONOSCOPY  01/2021    rpt 1-26    ROTATOR CUFF REPAIR  08/27/2013    Rotator Cuff Repair        ALLERGIES:   No Known Allergies     MEDICATIONS:     Current Outpatient Medications:     aspirin 81 mg EC tablet, Take 1 tablet (81 mg) by mouth 1 (one) time each day., Disp: , Rfl:     atorvastatin (Lipitor) 80 mg tablet, TAKE 1 TABLET BY MOUTH EVERY DAY, Disp: 90 tablet, Rfl: 3    empagliflozin (Jardiance) 25 mg, Take 1 tablet (25 mg) by mouth once daily., Disp: 90 tablet, Rfl: 3    glimepiride (Amaryl) 4 mg tablet, Take 1 tablet (4 mg) by mouth 2 times a day., Disp: 180 tablet, Rfl: 3    lancets 33 gauge misc, Use to test blood sugars twice daily, Disp: 100 each,  Rfl: 2    lancets (OneTouch UltraSoft Lancets) misc, Test twice daily., Disp: , Rfl:     lisinopriL-hydrochlorothiazide 20-25 mg tablet, Take 1 tablet by mouth once daily., Disp: 90 tablet, Rfl: 3    meloxicam (Mobic) 15 mg tablet, Take 1 tablet (15 mg) by mouth once daily., Disp: 90 tablet, Rfl: 1    OneTouch Ultra Test strip, Ultra Blue Strips: TEST BLOOD SUGAR TWICE A DAY, Disp: , Rfl:     semaglutide 0.25 mg or 0.5 mg (2 mg/3 mL) pen injector, Inject 0.25 mg under the skin 1 (one) time per week., Disp: 3 mL, Rfl: 3      SOCIAL HISTORY:  Patient  reports that he has never smoked. He has never used smokeless tobacco. He reports current alcohol use of about 2.0 standard drinks of alcohol per week.   Social History     Socioeconomic History    Marital status:      Spouse name: Not on file    Number of children: 3    Years of education: Not on file    Highest education level: Not on file   Occupational History    Occupation: ,    Tobacco Use    Smoking status: Never    Smokeless tobacco: Never   Substance and Sexual Activity    Alcohol use: Yes     Alcohol/week: 2.0 standard drinks of alcohol     Types: 2 Shots of liquor per week    Drug use: Not on file    Sexual activity: Not on file   Other Topics Concern    Not on file   Social History Narrative    Not on file     Social Determinants of Health     Financial Resource Strain: Not on file   Food Insecurity: Not on file   Transportation Needs: Not on file   Physical Activity: Inactive (2023)    Exercise Vital Sign     Days of Exercise per Week: 0 days     Minutes of Exercise per Session: 0 min   Stress: Not on file   Social Connections: Not on file   Intimate Partner Violence: Not on file   Housing Stability: Not on file       FAMILY HISTORY:  Family History   Problem Relation Name Age of Onset    Diabetes Mother      Pneumonia Father      Diabetes Brother       REVIEW OF SYSTEMS:   Constitutional: Negative for fever and chills.  Denies anorexia, weight loss.  Eyes: Negative for visual disturbance.   Respiratory: Negative for shortness of breath.    Cardiovascular: Negative for chest pain.   Gastrointestinal: Negative for nausea and vomiting.   Genitourinary: See interval history above.  Skin: Negative for rash.   Neurological: Negative for dizziness and numbness.   Psychiatric/Behavioral: Negative for confusion and decreased concentration.     PHYSICAL EXAM:  There were no vitals taken for this visit.  Constitutional: Patient appears well-developed and well-nourished. No distress.    Head: Normocephalic and atraumatic.    Neck: Normal range of motion.    Cardiovascular: Normal rate.    Pulmonary/Chest: Effort normal. No respiratory distress.   Abdominal: soft NTND  Musculoskeletal: Normal range of motion.    Neurological: Alert and oriented to person, place, and time.  Psychiatric: Normal mood and affect. Behavior is normal. Thought content normal.      LABORATORY REVIEW:     Lab Results   Component Value Date    BUN 19 04/01/2024    CREATININE 1.42 (H) 04/01/2024    EGFR 53 (L) 04/01/2024     04/01/2024    K 3.7 04/01/2024     04/01/2024    CO2 29 04/01/2024    CALCIUM 9.6 04/01/2024      Lab Results   Component Value Date    WBC 8.0 04/01/2024    RBC 6.03 (H) 04/01/2024    HGB 16.8 04/01/2024    HCT 52.3 (H) 04/01/2024    MCV 87 04/01/2024    MCH 27.9 04/01/2024    MCHC 32.1 04/01/2024    RDW 13.5 04/01/2024     (L) 04/01/2024        Lab Results   Component Value Date    PSA 6.28 (H) 04/01/2024    PSA 4.01 (H) 09/22/2023    PSA 4.01 (H) 03/16/2023    PSA 2.48 11/06/2020         Assessment:      1. Elevated PSA  Measure post void residual    PSA    MR prostate with snow boundaries if pirads 3 or above      2. Hyperplasia of prostate with lower urinary tract symptoms (LUTS)          Plan:    Reviewed and interpreted patient's PSA trend    Reviewed the different etiologies of an elevated PSA    Will obtain a repeat PSA and  prostate MRI    Counseled patient on the pathophysiology of prostate enlargement and the impact on urination    Will start on flomax. SE profile reviewed    32 minutes total spent on patient's care today; >50% time spent on counseling/coordination of care

## 2024-08-15 ENCOUNTER — OFFICE VISIT (OUTPATIENT)
Dept: UROLOGY | Facility: CLINIC | Age: 69
End: 2024-08-15
Payer: MEDICARE

## 2024-08-15 VITALS
HEART RATE: 89 BPM | WEIGHT: 219 LBS | HEIGHT: 65 IN | OXYGEN SATURATION: 94 % | TEMPERATURE: 98 F | RESPIRATION RATE: 14 BRPM | SYSTOLIC BLOOD PRESSURE: 139 MMHG | BODY MASS INDEX: 36.49 KG/M2 | DIASTOLIC BLOOD PRESSURE: 85 MMHG

## 2024-08-15 DIAGNOSIS — R97.20 ELEVATED PSA: Primary | ICD-10-CM

## 2024-08-15 DIAGNOSIS — N40.1 HYPERPLASIA OF PROSTATE WITH LOWER URINARY TRACT SYMPTOMS (LUTS): ICD-10-CM

## 2024-08-15 PROCEDURE — 99213 OFFICE O/P EST LOW 20 MIN: CPT | Performed by: STUDENT IN AN ORGANIZED HEALTH CARE EDUCATION/TRAINING PROGRAM

## 2024-08-15 RX ORDER — TAMSULOSIN HYDROCHLORIDE 0.4 MG/1
0.4 CAPSULE ORAL DAILY
Qty: 30 CAPSULE | Refills: 2 | Status: CANCELLED | OUTPATIENT
Start: 2024-08-15 | End: 2024-11-13

## 2024-08-15 ASSESSMENT — PAIN SCALES - GENERAL: PAINLEVEL: 0-NO PAIN

## 2024-08-19 ENCOUNTER — APPOINTMENT (OUTPATIENT)
Dept: PRIMARY CARE | Facility: CLINIC | Age: 69
End: 2024-08-19
Payer: MEDICARE

## 2024-08-19 VITALS
HEART RATE: 72 BPM | TEMPERATURE: 98 F | BODY MASS INDEX: 36.28 KG/M2 | WEIGHT: 218 LBS | SYSTOLIC BLOOD PRESSURE: 114 MMHG | DIASTOLIC BLOOD PRESSURE: 76 MMHG

## 2024-08-19 DIAGNOSIS — N18.31 STAGE 3A CHRONIC KIDNEY DISEASE (MULTI): ICD-10-CM

## 2024-08-19 DIAGNOSIS — I10 BENIGN ESSENTIAL HYPERTENSION: ICD-10-CM

## 2024-08-19 DIAGNOSIS — E66.01 CLASS 2 SEVERE OBESITY DUE TO EXCESS CALORIES WITH SERIOUS COMORBIDITY AND BODY MASS INDEX (BMI) OF 36.0 TO 36.9 IN ADULT (MULTI): ICD-10-CM

## 2024-08-19 DIAGNOSIS — E11.9 DIABETES MELLITUS TYPE 2 WITHOUT RETINOPATHY (MULTI): Primary | ICD-10-CM

## 2024-08-19 DIAGNOSIS — E78.49 FAMILIAL COMBINED HYPERLIPIDEMIA: ICD-10-CM

## 2024-08-19 PROCEDURE — 99213 OFFICE O/P EST LOW 20 MIN: CPT | Performed by: INTERNAL MEDICINE

## 2024-08-19 PROCEDURE — 3048F LDL-C <100 MG/DL: CPT | Performed by: INTERNAL MEDICINE

## 2024-08-19 PROCEDURE — G2211 COMPLEX E/M VISIT ADD ON: HCPCS | Performed by: INTERNAL MEDICINE

## 2024-08-19 PROCEDURE — 1124F ACP DISCUSS-NO DSCNMKR DOCD: CPT | Performed by: INTERNAL MEDICINE

## 2024-08-19 PROCEDURE — 1159F MED LIST DOCD IN RCRD: CPT | Performed by: INTERNAL MEDICINE

## 2024-08-19 PROCEDURE — 3061F NEG MICROALBUMINURIA REV: CPT | Performed by: INTERNAL MEDICINE

## 2024-08-19 PROCEDURE — 3078F DIAST BP <80 MM HG: CPT | Performed by: INTERNAL MEDICINE

## 2024-08-19 PROCEDURE — 1036F TOBACCO NON-USER: CPT | Performed by: INTERNAL MEDICINE

## 2024-08-19 PROCEDURE — 1160F RVW MEDS BY RX/DR IN RCRD: CPT | Performed by: INTERNAL MEDICINE

## 2024-08-19 PROCEDURE — 3074F SYST BP LT 130 MM HG: CPT | Performed by: INTERNAL MEDICINE

## 2024-08-19 ASSESSMENT — ENCOUNTER SYMPTOMS
ABDOMINAL PAIN: 0
HEMATURIA: 0
ABDOMINAL DISTENTION: 0
CONSTIPATION: 0
ACTIVITY CHANGE: 0
APPETITE CHANGE: 0
SLEEP DISTURBANCE: 0
DYSURIA: 0
HEADACHES: 0
DIAPHORESIS: 0
CHILLS: 0
JOINT SWELLING: 0
ARTHRALGIAS: 1
DIARRHEA: 0
TREMORS: 0
EYE ITCHING: 0
POLYDIPSIA: 0
PALPITATIONS: 0
STRIDOR: 0
DIZZINESS: 0
SPEECH DIFFICULTY: 0
CHEST TIGHTNESS: 0
WHEEZING: 0
BRUISES/BLEEDS EASILY: 0
WEAKNESS: 0
PHOTOPHOBIA: 0
COUGH: 0
NECK STIFFNESS: 0
FACIAL SWELLING: 0
ANAL BLEEDING: 0
SEIZURES: 0
CHOKING: 0
EYE REDNESS: 0
DIFFICULTY URINATING: 0
MYALGIAS: 0
RHINORRHEA: 0
EYE PAIN: 0
POLYPHAGIA: 0
NECK PAIN: 0
SINUS PRESSURE: 0
EYE DISCHARGE: 0
FLANK PAIN: 0
FREQUENCY: 0
VOICE CHANGE: 0
SINUS PAIN: 0
SORE THROAT: 0
BLOOD IN STOOL: 0
ADENOPATHY: 0
COLOR CHANGE: 0
WOUND: 0
TROUBLE SWALLOWING: 0
FACIAL ASYMMETRY: 0
RECTAL PAIN: 0
LIGHT-HEADEDNESS: 0
FATIGUE: 0
VOMITING: 0
BACK PAIN: 0
NAUSEA: 0
SHORTNESS OF BREATH: 0
NUMBNESS: 0

## 2024-08-19 NOTE — PROGRESS NOTES
Subjective   Patient ID: Juan Antonio Araujo is a 68 y.o. male who presents for Diabetes (Pt present today follow up. ls).    Patient presents for follow-up.  He has been compliant with his medications, diet and has started to exercise.  He reports that his sugars are in the low to mid 100s.  He did not start Ozempic due to cost and fear of side effects.  He overall feels okay.  He denies any headaches, no dizziness, no chest pain or shortness of breath.  Denies abdominal pain no nausea vomiting or diarrhea.  He reports no new musculoskeletal complaints.    Diabetes  Pertinent negatives for hypoglycemia include no dizziness, headaches, pallor, seizures, speech difficulty or tremors. Pertinent negatives for diabetes include no chest pain, no fatigue, no polydipsia, no polyphagia, no polyuria and no weakness.        Review of Systems   Constitutional:  Negative for activity change, appetite change, chills, diaphoresis and fatigue.   HENT:  Negative for congestion, dental problem, drooling, ear discharge, ear pain, facial swelling, hearing loss, mouth sores, nosebleeds, postnasal drip, rhinorrhea, sinus pressure, sinus pain, sneezing, sore throat, tinnitus, trouble swallowing and voice change.    Eyes:  Negative for photophobia, pain, discharge, redness, itching and visual disturbance.   Respiratory:  Negative for cough, choking, chest tightness, shortness of breath, wheezing and stridor.    Cardiovascular:  Negative for chest pain, palpitations and leg swelling.   Gastrointestinal:  Negative for abdominal distention, abdominal pain, anal bleeding, blood in stool, constipation, diarrhea, nausea, rectal pain and vomiting.   Endocrine: Negative for cold intolerance, heat intolerance, polydipsia, polyphagia and polyuria.   Genitourinary:  Negative for decreased urine volume, difficulty urinating, dysuria, enuresis, flank pain, frequency, genital sores, hematuria and urgency.   Musculoskeletal:  Positive for arthralgias.  Negative for back pain, gait problem, joint swelling, myalgias, neck pain and neck stiffness.   Skin:  Negative for color change, pallor, rash and wound.   Neurological:  Negative for dizziness, tremors, seizures, syncope, facial asymmetry, speech difficulty, weakness, light-headedness, numbness and headaches.   Hematological:  Negative for adenopathy. Does not bruise/bleed easily.   Psychiatric/Behavioral:  Negative for sleep disturbance.        Objective   /76   Pulse 72   Temp 36.7 °C (98 °F)   Wt 98.9 kg (218 lb)   BMI 36.28 kg/m²     Physical Exam  Constitutional:       Appearance: Normal appearance.   Cardiovascular:      Rate and Rhythm: Normal rate and regular rhythm.      Heart sounds: No murmur heard.     No gallop.   Pulmonary:      Effort: No respiratory distress.      Breath sounds: No wheezing or rales.   Abdominal:      General: There is no distension.      Palpations: There is no mass.      Tenderness: There is no abdominal tenderness. There is no guarding.   Musculoskeletal:      Right lower leg: No edema.      Left lower leg: No edema.   Neurological:      Mental Status: He is alert.         Assessment/Plan   Diagnoses and all orders for this visit:  Diabetes mellitus type 2 without retinopathy (Multi)-hemoglobin A1c was 8.0.  He does not want to take any other medication or insulin.  He will be agreeable if his A1c is elevated in 2 months.  He will schedule an eye appointment  -     Basic Metabolic Panel; Future  -     Hemoglobin A1c; Future  Benign essential hypertension-stable on present medication  Familial combined hyperlipidemia-we will continue with statin  Stage 3a chronic kidney disease (Multi)-recheck creatinine  Class 2 severe obesity due to excess calories with serious comorbidity and body mass index (BMI) of 36.0 to 36.9 in adult (Multi)-diet and exercise.  Health maintenance-refuses immunizations.

## 2024-08-20 DIAGNOSIS — N40.1 HYPERPLASIA OF PROSTATE WITH LOWER URINARY TRACT SYMPTOMS (LUTS): Primary | ICD-10-CM

## 2024-08-20 RX ORDER — TAMSULOSIN HYDROCHLORIDE 0.4 MG/1
0.4 CAPSULE ORAL DAILY
Qty: 90 CAPSULE | Refills: 2 | Status: SHIPPED | OUTPATIENT
Start: 2024-08-20 | End: 2025-05-17

## 2024-08-28 ENCOUNTER — HOSPITAL ENCOUNTER (OUTPATIENT)
Dept: RADIOLOGY | Facility: HOSPITAL | Age: 69
Discharge: HOME | End: 2024-08-28
Payer: MEDICARE

## 2024-08-28 VITALS — WEIGHT: 216.05 LBS | BODY MASS INDEX: 35.95 KG/M2

## 2024-08-28 DIAGNOSIS — R97.20 ELEVATED PSA: ICD-10-CM

## 2024-08-28 PROCEDURE — 72197 MRI PELVIS W/O & W/DYE: CPT

## 2024-08-28 PROCEDURE — 2550000001 HC RX 255 CONTRASTS: Performed by: STUDENT IN AN ORGANIZED HEALTH CARE EDUCATION/TRAINING PROGRAM

## 2024-08-28 PROCEDURE — A9575 INJ GADOTERATE MEGLUMI 0.1ML: HCPCS | Performed by: STUDENT IN AN ORGANIZED HEALTH CARE EDUCATION/TRAINING PROGRAM

## 2024-08-28 RX ORDER — GADOTERATE MEGLUMINE 376.9 MG/ML
19 INJECTION INTRAVENOUS
Status: COMPLETED | OUTPATIENT
Start: 2024-08-28 | End: 2024-08-28

## 2024-09-10 DIAGNOSIS — E11.9 DIABETES MELLITUS TYPE 2 WITHOUT RETINOPATHY (MULTI): ICD-10-CM

## 2024-09-10 DIAGNOSIS — E78.49 OTHER HYPERLIPIDEMIA: ICD-10-CM

## 2024-09-10 RX ORDER — GLIMEPIRIDE 4 MG/1
4 TABLET ORAL 2 TIMES DAILY
Qty: 180 TABLET | Refills: 3 | Status: SHIPPED | OUTPATIENT
Start: 2024-09-10

## 2024-09-10 RX ORDER — ATORVASTATIN CALCIUM 80 MG/1
80 TABLET, FILM COATED ORAL DAILY
Qty: 90 TABLET | Refills: 3 | Status: SHIPPED | OUTPATIENT
Start: 2024-09-10

## 2024-09-15 NOTE — PROGRESS NOTES
PROBLEM LIST:  1. Elevated PSA        2. Hyperplasia of prostate with lower urinary tract symptoms (LUTS)            HISTORY OF PRESENT ILLNESS:   Juan Antonio Araujo is a  68 year old male  with history of elevated PSA. Denies any family history of cancer. Denies any family history of prostate cancer. MR prostate completed 8/15/2024.     Denies hematuria, dysuria, nocturia, flank pain, and bothersome frequency or urgency. Denies pushing or straining to void and states he feels he empties his bladder when voiding. Post-void residual 8/15/24 208 cc. He currently utilizes Flomax 0.4mg.     PAST MEDICAL HISTORY:  PSA Trend:  4/1/2024:6.28  9/22/2023: 4.01  3/16/2023: 4.01  11/6/2020: 2.48    Past Medical History:   Diagnosis Date    Essential (primary) hypertension 09/24/2013    Benign essential hypertension    Lower abdominal pain, unspecified     Groin pain    Personal history of other endocrine, nutritional and metabolic disease     History of hyperlipidemia       PAST SURGICAL HISTORY:  Past Surgical History:   Procedure Laterality Date    COLONOSCOPY  05/06/2016    Complete Colonoscopy    COLONOSCOPY  01/2021    rpt 1-26    ROTATOR CUFF REPAIR  08/27/2013    Rotator Cuff Repair        ALLERGIES:   No Known Allergies     MEDICATIONS:     Current Outpatient Medications:     aspirin 81 mg EC tablet, Take 1 tablet (81 mg) by mouth once daily., Disp: , Rfl:     atorvastatin (Lipitor) 80 mg tablet, Take 1 tablet (80 mg) by mouth once daily., Disp: 90 tablet, Rfl: 3    empagliflozin (Jardiance) 25 mg, Take 1 tablet (25 mg) by mouth once daily., Disp: 90 tablet, Rfl: 3    glimepiride (Amaryl) 4 mg tablet, Take 1 tablet (4 mg) by mouth 2 times a day., Disp: 180 tablet, Rfl: 3    lancets (OneTouch UltraSoft Lancets) misc, Test twice daily., Disp: , Rfl:     lancets 33 gauge misc, Use to test blood sugars twice daily, Disp: 100 each, Rfl: 2    lisinopriL-hydrochlorothiazide 20-25 mg tablet, Take 1 tablet by mouth once  daily., Disp: 90 tablet, Rfl: 3    OneTouch Ultra Test strip, Ultra Blue Strips: TEST BLOOD SUGAR TWICE A DAY, Disp: , Rfl:     semaglutide 0.25 mg or 0.5 mg (2 mg/3 mL) pen injector, Inject 0.25 mg under the skin 1 (one) time per week. (Patient not taking: Reported on 8/15/2024), Disp: 3 mL, Rfl: 3    tamsulosin (Flomax) 0.4 mg 24 hr capsule, Take 1 capsule (0.4 mg) by mouth once daily., Disp: 90 capsule, Rfl: 2      SOCIAL HISTORY:  Patient  reports that he has never smoked. He has never used smokeless tobacco. He reports current alcohol use of about 2.0 standard drinks of alcohol per week.   Social History     Socioeconomic History    Marital status:      Spouse name: Not on file    Number of children: 3    Years of education: Not on file    Highest education level: Not on file   Occupational History    Occupation: ,    Tobacco Use    Smoking status: Never    Smokeless tobacco: Never   Substance and Sexual Activity    Alcohol use: Yes     Alcohol/week: 2.0 standard drinks of alcohol     Types: 2 Shots of liquor per week    Drug use: Not on file    Sexual activity: Not on file   Other Topics Concern    Not on file   Social History Narrative    Not on file     Social Determinants of Health     Financial Resource Strain: Not on file   Food Insecurity: Not on file   Transportation Needs: Not on file   Physical Activity: Inactive (2023)    Exercise Vital Sign     Days of Exercise per Week: 0 days     Minutes of Exercise per Session: 0 min   Stress: Not on file   Social Connections: Not on file   Intimate Partner Violence: Not on file   Housing Stability: Not on file       FAMILY HISTORY:  Family History   Problem Relation Name Age of Onset    Diabetes Mother      Pneumonia Father      Diabetes Brother       REVIEW OF SYSTEMS:   Constitutional: Negative for fever and chills. Denies anorexia, weight loss.  Eyes: Negative for visual disturbance.   Respiratory: Negative for shortness of  breath.    Cardiovascular: Negative for chest pain.   Gastrointestinal: Negative for nausea and vomiting.   Genitourinary: See interval history above.  Skin: Negative for rash.   Neurological: Negative for dizziness and numbness.   Psychiatric/Behavioral: Negative for confusion and decreased concentration.     PHYSICAL EXAM:  There were no vitals taken for this visit.  Constitutional: Patient appears well-developed and well-nourished. No distress.    Head: Normocephalic and atraumatic.    Neck: Normal range of motion.    Cardiovascular: Normal rate.    Pulmonary/Chest: Effort normal. No respiratory distress.   Abdominal: soft NTND  Musculoskeletal: Normal range of motion.    Neurological: Alert and oriented to person, place, and time.  Psychiatric: Normal mood and affect. Behavior is normal. Thought content normal.      LABORATORY REVIEW:     Lab Results   Component Value Date    BUN 19 04/01/2024    CREATININE 1.42 (H) 04/01/2024    EGFR 53 (L) 04/01/2024     04/01/2024    K 3.7 04/01/2024     04/01/2024    CO2 29 04/01/2024    CALCIUM 9.6 04/01/2024      Lab Results   Component Value Date    WBC 8.0 04/01/2024    RBC 6.03 (H) 04/01/2024    HGB 16.8 04/01/2024    HCT 52.3 (H) 04/01/2024    MCV 87 04/01/2024    MCH 27.9 04/01/2024    MCHC 32.1 04/01/2024    RDW 13.5 04/01/2024     (L) 04/01/2024        Lab Results   Component Value Date    PSA 6.28 (H) 04/01/2024    PSA 4.01 (H) 09/22/2023    PSA 4.01 (H) 03/16/2023    PSA 2.48 11/06/2020         Assessment:      1. Elevated PSA        2. Hyperplasia of prostate with lower urinary tract symptoms (LUTS)            Plan:   Reviewed and interpreted patient's PSA trend   Reviewed and interpreted patient's prostate MRI   Counseled patient that I recommend proceeding to a fusion biopsy of the prostate   Counseled patient on how we perform TP fusion biopsy of the prostate, the r/b and the anticipated recovery  Will proceed to scheduling      31 minutes  total spent on patient's care today; >50% time spent on counseling/coordination of care      Scribe Attestation  By signing my name below, I, Boyd Kumari   attest that this documentation has been prepared under the direction and in the presence of Dario Barraza MD.

## 2024-09-19 ENCOUNTER — TELEMEDICINE (OUTPATIENT)
Dept: UROLOGY | Facility: CLINIC | Age: 69
End: 2024-09-19
Payer: MEDICARE

## 2024-09-19 DIAGNOSIS — R97.20 ELEVATED PSA: ICD-10-CM

## 2024-09-19 DIAGNOSIS — N40.1 HYPERPLASIA OF PROSTATE WITH LOWER URINARY TRACT SYMPTOMS (LUTS): ICD-10-CM

## 2024-09-19 PROCEDURE — 3061F NEG MICROALBUMINURIA REV: CPT | Performed by: STUDENT IN AN ORGANIZED HEALTH CARE EDUCATION/TRAINING PROGRAM

## 2024-09-19 PROCEDURE — 99214 OFFICE O/P EST MOD 30 MIN: CPT | Performed by: STUDENT IN AN ORGANIZED HEALTH CARE EDUCATION/TRAINING PROGRAM

## 2024-09-19 PROCEDURE — 3048F LDL-C <100 MG/DL: CPT | Performed by: STUDENT IN AN ORGANIZED HEALTH CARE EDUCATION/TRAINING PROGRAM

## 2024-10-21 ENCOUNTER — LAB (OUTPATIENT)
Dept: LAB | Facility: LAB | Age: 69
End: 2024-10-21
Payer: MEDICARE

## 2024-10-21 ENCOUNTER — APPOINTMENT (OUTPATIENT)
Dept: PRIMARY CARE | Facility: CLINIC | Age: 69
End: 2024-10-21
Payer: MEDICARE

## 2024-10-21 VITALS
SYSTOLIC BLOOD PRESSURE: 124 MMHG | HEART RATE: 80 BPM | DIASTOLIC BLOOD PRESSURE: 76 MMHG | TEMPERATURE: 98 F | WEIGHT: 227 LBS | BODY MASS INDEX: 37.77 KG/M2

## 2024-10-21 DIAGNOSIS — E66.01 OBESITY, MORBID (MULTI): ICD-10-CM

## 2024-10-21 DIAGNOSIS — R97.20 ELEVATED PSA: ICD-10-CM

## 2024-10-21 DIAGNOSIS — E11.9 DIABETES MELLITUS TYPE 2 WITHOUT RETINOPATHY (MULTI): Primary | ICD-10-CM

## 2024-10-21 DIAGNOSIS — E11.9 DIABETES MELLITUS TYPE 2 WITHOUT RETINOPATHY (MULTI): ICD-10-CM

## 2024-10-21 DIAGNOSIS — M25.552 PAIN OF LEFT HIP: ICD-10-CM

## 2024-10-21 DIAGNOSIS — I10 BENIGN ESSENTIAL HYPERTENSION: ICD-10-CM

## 2024-10-21 DIAGNOSIS — E78.49 FAMILIAL COMBINED HYPERLIPIDEMIA: ICD-10-CM

## 2024-10-21 DIAGNOSIS — R74.8 ELEVATED LIVER ENZYMES: Primary | ICD-10-CM

## 2024-10-21 DIAGNOSIS — R74.8 ELEVATED LIVER ENZYMES: ICD-10-CM

## 2024-10-21 DIAGNOSIS — N18.31 STAGE 3A CHRONIC KIDNEY DISEASE (MULTI): ICD-10-CM

## 2024-10-21 DIAGNOSIS — E11.22 TYPE 2 DIABETES MELLITUS WITH CHRONIC KIDNEY DISEASE, WITHOUT LONG-TERM CURRENT USE OF INSULIN, UNSPECIFIED CKD STAGE (MULTI): ICD-10-CM

## 2024-10-21 LAB
ALT SERPL W P-5'-P-CCNC: 35 U/L (ref 10–52)
ANION GAP SERPL CALC-SCNC: 13 MMOL/L (ref 10–20)
AST SERPL W P-5'-P-CCNC: 58 U/L (ref 9–39)
BUN SERPL-MCNC: 22 MG/DL (ref 6–23)
CALCIUM SERPL-MCNC: 9.3 MG/DL (ref 8.6–10.6)
CHLORIDE SERPL-SCNC: 102 MMOL/L (ref 98–107)
CHOLEST SERPL-MCNC: 140 MG/DL (ref 0–199)
CHOLESTEROL/HDL RATIO: 4.6
CO2 SERPL-SCNC: 27 MMOL/L (ref 21–32)
CREAT SERPL-MCNC: 1.48 MG/DL (ref 0.5–1.3)
EGFRCR SERPLBLD CKD-EPI 2021: 51 ML/MIN/1.73M*2
EST. AVERAGE GLUCOSE BLD GHB EST-MCNC: 169 MG/DL
GLUCOSE SERPL-MCNC: 149 MG/DL (ref 74–99)
HBA1C MFR BLD: 7.5 %
HBV CORE AB SER QL: NONREACTIVE
HBV SURFACE AB SER-ACNC: <3.1 MIU/ML
HBV SURFACE AG SERPL QL IA: NONREACTIVE
HCV AB SER QL: NONREACTIVE
HDLC SERPL-MCNC: 30.4 MG/DL
LDLC SERPL CALC-MCNC: 72 MG/DL
NON HDL CHOLESTEROL: 110 MG/DL (ref 0–149)
POTASSIUM SERPL-SCNC: 3.8 MMOL/L (ref 3.5–5.3)
PSA SERPL-MCNC: 5.33 NG/ML
SODIUM SERPL-SCNC: 138 MMOL/L (ref 136–145)
TRIGL SERPL-MCNC: 190 MG/DL (ref 0–149)
VLDL: 38 MG/DL (ref 0–40)

## 2024-10-21 PROCEDURE — 3048F LDL-C <100 MG/DL: CPT | Performed by: INTERNAL MEDICINE

## 2024-10-21 PROCEDURE — 3061F NEG MICROALBUMINURIA REV: CPT | Performed by: INTERNAL MEDICINE

## 2024-10-21 PROCEDURE — 3078F DIAST BP <80 MM HG: CPT | Performed by: INTERNAL MEDICINE

## 2024-10-21 PROCEDURE — 36415 COLL VENOUS BLD VENIPUNCTURE: CPT

## 2024-10-21 PROCEDURE — G2211 COMPLEX E/M VISIT ADD ON: HCPCS | Performed by: INTERNAL MEDICINE

## 2024-10-21 PROCEDURE — 86706 HEP B SURFACE ANTIBODY: CPT

## 2024-10-21 PROCEDURE — 83036 HEMOGLOBIN GLYCOSYLATED A1C: CPT

## 2024-10-21 PROCEDURE — 80061 LIPID PANEL: CPT

## 2024-10-21 PROCEDURE — 84450 TRANSFERASE (AST) (SGOT): CPT

## 2024-10-21 PROCEDURE — 84153 ASSAY OF PSA TOTAL: CPT

## 2024-10-21 PROCEDURE — 87340 HEPATITIS B SURFACE AG IA: CPT

## 2024-10-21 PROCEDURE — 86704 HEP B CORE ANTIBODY TOTAL: CPT

## 2024-10-21 PROCEDURE — 1036F TOBACCO NON-USER: CPT | Performed by: INTERNAL MEDICINE

## 2024-10-21 PROCEDURE — 1160F RVW MEDS BY RX/DR IN RCRD: CPT | Performed by: INTERNAL MEDICINE

## 2024-10-21 PROCEDURE — 80048 BASIC METABOLIC PNL TOTAL CA: CPT

## 2024-10-21 PROCEDURE — 84460 ALANINE AMINO (ALT) (SGPT): CPT

## 2024-10-21 PROCEDURE — 3074F SYST BP LT 130 MM HG: CPT | Performed by: INTERNAL MEDICINE

## 2024-10-21 PROCEDURE — 86803 HEPATITIS C AB TEST: CPT

## 2024-10-21 PROCEDURE — 1159F MED LIST DOCD IN RCRD: CPT | Performed by: INTERNAL MEDICINE

## 2024-10-21 PROCEDURE — 99214 OFFICE O/P EST MOD 30 MIN: CPT | Performed by: INTERNAL MEDICINE

## 2024-10-21 RX ORDER — TIRZEPATIDE 2.5 MG/.5ML
2.5 INJECTION, SOLUTION SUBCUTANEOUS WEEKLY
Qty: 3 ML | Refills: 3 | Status: SHIPPED | OUTPATIENT
Start: 2024-10-21

## 2024-10-21 ASSESSMENT — ENCOUNTER SYMPTOMS
BLOOD IN STOOL: 0
SINUS PAIN: 0
RECTAL PAIN: 0
JOINT SWELLING: 0
EYE DISCHARGE: 0
POLYDIPSIA: 0
NECK PAIN: 0
PHOTOPHOBIA: 0
PALPITATIONS: 0
ANAL BLEEDING: 0
HEADACHES: 0
DIZZINESS: 0
NAUSEA: 0
ABDOMINAL DISTENTION: 0
ADENOPATHY: 0
VOICE CHANGE: 0
SHORTNESS OF BREATH: 0
APPETITE CHANGE: 0
MYALGIAS: 0
DIARRHEA: 0
FACIAL SWELLING: 0
LIGHT-HEADEDNESS: 0
TROUBLE SWALLOWING: 0
ACTIVITY CHANGE: 0
WHEEZING: 0
DYSURIA: 0
EYE PAIN: 0
BRUISES/BLEEDS EASILY: 0
HEMATURIA: 0
ABDOMINAL PAIN: 0
NUMBNESS: 0
DIAPHORESIS: 0
VOMITING: 0
CHOKING: 0
WOUND: 0
FLANK PAIN: 0
COUGH: 1
FATIGUE: 0
POLYPHAGIA: 0
BACK PAIN: 0
EYE ITCHING: 0
RHINORRHEA: 0
STRIDOR: 0
TREMORS: 0
COLOR CHANGE: 0
NECK STIFFNESS: 0
FREQUENCY: 0
SORE THROAT: 0
WEAKNESS: 0
ARTHRALGIAS: 0
SLEEP DISTURBANCE: 0
SPEECH DIFFICULTY: 0
CHILLS: 0
FACIAL ASYMMETRY: 0
SINUS PRESSURE: 0
SEIZURES: 0
CONSTIPATION: 0
DIFFICULTY URINATING: 0
EYE REDNESS: 0
CHEST TIGHTNESS: 0

## 2024-10-21 ASSESSMENT — PATIENT HEALTH QUESTIONNAIRE - PHQ9
SUM OF ALL RESPONSES TO PHQ9 QUESTIONS 1 AND 2: 0
2. FEELING DOWN, DEPRESSED OR HOPELESS: NOT AT ALL
1. LITTLE INTEREST OR PLEASURE IN DOING THINGS: NOT AT ALL

## 2024-10-21 NOTE — PROGRESS NOTES
Subjective   Patient ID: Juan Antonio Araujo is a 69 y.o. male who presents for No chief complaint on file..    Patient presents for follow-up.  He has been compliant with his medications, diet and exercise.  He has been complaining of left hip pain over the last few months.  He denies any history of trauma.  He otherwise feels okay.  Denies any headaches, no dizziness, no chest pain or shortness of breath.  He denies abdominal pain or nausea vomiting or diarrhea.  He reports no other new musculoskeletal complaints.         Review of Systems   Constitutional:  Negative for activity change, appetite change, chills, diaphoresis and fatigue.   HENT:  Negative for congestion, dental problem, drooling, ear discharge, ear pain, facial swelling, hearing loss, mouth sores, nosebleeds, postnasal drip, rhinorrhea, sinus pressure, sinus pain, sneezing, sore throat, tinnitus, trouble swallowing and voice change.    Eyes:  Negative for photophobia, pain, discharge, redness, itching and visual disturbance.   Respiratory:  Positive for cough. Negative for choking, chest tightness, shortness of breath, wheezing and stridor.    Cardiovascular:  Negative for chest pain, palpitations and leg swelling.   Gastrointestinal:  Negative for abdominal distention, abdominal pain, anal bleeding, blood in stool, constipation, diarrhea, nausea, rectal pain and vomiting.   Endocrine: Negative for cold intolerance, heat intolerance, polydipsia, polyphagia and polyuria.   Genitourinary:  Negative for decreased urine volume, difficulty urinating, dysuria, enuresis, flank pain, frequency, genital sores, hematuria and urgency.   Musculoskeletal:  Negative for arthralgias, back pain, gait problem, joint swelling, myalgias, neck pain and neck stiffness.   Skin:  Negative for color change, pallor, rash and wound.   Neurological:  Negative for dizziness, tremors, seizures, syncope, facial asymmetry, speech difficulty, weakness, light-headedness, numbness and  headaches.   Hematological:  Negative for adenopathy. Does not bruise/bleed easily.   Psychiatric/Behavioral:  Negative for sleep disturbance.        Objective   /76   Pulse 80   Temp 36.7 °C (98 °F)   Wt 103 kg (227 lb)   BMI 37.77 kg/m²     Physical Exam  Constitutional:       Appearance: Normal appearance.   Cardiovascular:      Rate and Rhythm: Normal rate and regular rhythm.      Heart sounds: No murmur heard.     No gallop.   Pulmonary:      Effort: No respiratory distress.      Breath sounds: No wheezing or rales.   Abdominal:      General: There is no distension.      Palpations: There is no mass.      Tenderness: There is no abdominal tenderness. There is no guarding.   Musculoskeletal:         General: Normal range of motion.      Right lower leg: No edema.      Left lower leg: No edema.   Neurological:      Mental Status: He is alert.         Assessment/Plan   Diagnoses and all orders for this visit:  Diabetes mellitus type 2 without retinopathy (Multi)-hemoglobin A1c was 7.8.  Will start Mounjaro.  Eye appointment has been done  -     tirzepatide (Mounjaro) 2.5 mg/0.5 mL pen injector; Inject 2.5 mg under the skin 1 (one) time per week.  -     Basic Metabolic Panel; Future  Pain of left hip-will check an x-ray  -     XR hip left with pelvis when performed 2 or 3 views; Future  Familial combined hyperlipidemia-check a lipid profile  -     Lipid Panel; Future  -     Alanine Aminotransferase; Future  -     Aspartate Aminotransferase; Future  Elevated PSA-he will follow-up with urology as scheduled.  He is going to have a biopsy  Stage 3a chronic kidney disease (Multi)-recheck creatinine-stable on present medication-diet and exercise  Benign essential hypertension-stable on present medication  Obesity, morbid (Multi).-Diet and exercise  Health maintenance-colonoscopy has been done.  He will get RSV tetanus shingles vaccine at the pharmacy.  Eye and dental appointments have been done

## 2025-01-25 DIAGNOSIS — I10 BENIGN ESSENTIAL HYPERTENSION: ICD-10-CM

## 2025-01-27 ENCOUNTER — APPOINTMENT (OUTPATIENT)
Dept: PRIMARY CARE | Facility: CLINIC | Age: 70
End: 2025-01-27
Payer: MEDICARE

## 2025-01-27 VITALS
SYSTOLIC BLOOD PRESSURE: 132 MMHG | WEIGHT: 226 LBS | DIASTOLIC BLOOD PRESSURE: 78 MMHG | BODY MASS INDEX: 37.61 KG/M2 | HEART RATE: 80 BPM

## 2025-01-27 DIAGNOSIS — E11.9 DIABETES MELLITUS TYPE 2 WITHOUT RETINOPATHY (MULTI): Primary | ICD-10-CM

## 2025-01-27 DIAGNOSIS — E78.49 FAMILIAL COMBINED HYPERLIPIDEMIA: ICD-10-CM

## 2025-01-27 DIAGNOSIS — I10 BENIGN ESSENTIAL HYPERTENSION: ICD-10-CM

## 2025-01-27 DIAGNOSIS — M48.061 SPINAL STENOSIS OF LUMBAR REGION WITHOUT NEUROGENIC CLAUDICATION: ICD-10-CM

## 2025-01-27 DIAGNOSIS — N18.31 CHRONIC KIDNEY DISEASE, STAGE 3A (MULTI): ICD-10-CM

## 2025-01-27 DIAGNOSIS — E66.01 OBESITY, MORBID (MULTI): ICD-10-CM

## 2025-01-27 LAB
POC FINGERSTICK BLOOD GLUCOSE: 226 MG/DL (ref 70–100)
POC HEMOGLOBIN A1C: 8.9 % (ref 4.2–6.5)

## 2025-01-27 PROCEDURE — 1159F MED LIST DOCD IN RCRD: CPT | Performed by: INTERNAL MEDICINE

## 2025-01-27 PROCEDURE — 3075F SYST BP GE 130 - 139MM HG: CPT | Performed by: INTERNAL MEDICINE

## 2025-01-27 PROCEDURE — 83036 HEMOGLOBIN GLYCOSYLATED A1C: CPT | Performed by: INTERNAL MEDICINE

## 2025-01-27 PROCEDURE — 99214 OFFICE O/P EST MOD 30 MIN: CPT | Performed by: INTERNAL MEDICINE

## 2025-01-27 PROCEDURE — 1160F RVW MEDS BY RX/DR IN RCRD: CPT | Performed by: INTERNAL MEDICINE

## 2025-01-27 PROCEDURE — 82962 GLUCOSE BLOOD TEST: CPT | Performed by: INTERNAL MEDICINE

## 2025-01-27 PROCEDURE — G0439 PPPS, SUBSEQ VISIT: HCPCS | Performed by: INTERNAL MEDICINE

## 2025-01-27 PROCEDURE — 3078F DIAST BP <80 MM HG: CPT | Performed by: INTERNAL MEDICINE

## 2025-01-27 PROCEDURE — 1170F FXNL STATUS ASSESSED: CPT | Performed by: INTERNAL MEDICINE

## 2025-01-27 PROCEDURE — 1124F ACP DISCUSS-NO DSCNMKR DOCD: CPT | Performed by: INTERNAL MEDICINE

## 2025-01-27 RX ORDER — LISINOPRIL AND HYDROCHLOROTHIAZIDE 20; 25 MG/1; MG/1
1 TABLET ORAL DAILY
Qty: 90 TABLET | Refills: 3 | Status: SHIPPED | OUTPATIENT
Start: 2025-01-27

## 2025-01-27 RX ORDER — LIDOCAINE 50 MG/G
1 PATCH TOPICAL DAILY
Qty: 12 PATCH | Refills: 3 | Status: SHIPPED | OUTPATIENT
Start: 2025-01-27 | End: 2026-01-27

## 2025-01-27 ASSESSMENT — ENCOUNTER SYMPTOMS
SHORTNESS OF BREATH: 0
FACIAL SWELLING: 0
BLOOD IN STOOL: 0
SPEECH DIFFICULTY: 0
NUMBNESS: 0
COUGH: 0
MYALGIAS: 0
HEADACHES: 0
RECTAL PAIN: 0
ABDOMINAL PAIN: 0
DYSURIA: 0
COLOR CHANGE: 0
SINUS PRESSURE: 0
WOUND: 0
VOMITING: 0
VOICE CHANGE: 0
NECK PAIN: 0
SLEEP DISTURBANCE: 0
SEIZURES: 0
WHEEZING: 0
FACIAL ASYMMETRY: 0
DIZZINESS: 0
CHEST TIGHTNESS: 0
PALPITATIONS: 0
CONSTIPATION: 0
TREMORS: 0
RHINORRHEA: 0
CHILLS: 0
ARTHRALGIAS: 0
EYE DISCHARGE: 0
APPETITE CHANGE: 0
STRIDOR: 0
EYE ITCHING: 0
WEAKNESS: 0
FLANK PAIN: 0
BACK PAIN: 0
FATIGUE: 0
BRUISES/BLEEDS EASILY: 0
ACTIVITY CHANGE: 0
DIARRHEA: 0
ADENOPATHY: 0
POLYDIPSIA: 0
ANAL BLEEDING: 0
LIGHT-HEADEDNESS: 0
JOINT SWELLING: 0
EYE REDNESS: 0
HEMATURIA: 0
TROUBLE SWALLOWING: 0
PHOTOPHOBIA: 0
SINUS PAIN: 0
POLYPHAGIA: 0
DIFFICULTY URINATING: 0
EYE PAIN: 0
CHOKING: 0
DIAPHORESIS: 0
SORE THROAT: 0
ABDOMINAL DISTENTION: 0
NECK STIFFNESS: 0
NAUSEA: 0
FREQUENCY: 0

## 2025-01-27 ASSESSMENT — ACTIVITIES OF DAILY LIVING (ADL)
DOING_HOUSEWORK: INDEPENDENT
TAKING_MEDICATION: INDEPENDENT
BATHING: INDEPENDENT
GROCERY_SHOPPING: INDEPENDENT
DRESSING: INDEPENDENT
MANAGING_FINANCES: INDEPENDENT

## 2025-01-27 ASSESSMENT — PATIENT HEALTH QUESTIONNAIRE - PHQ9
SUM OF ALL RESPONSES TO PHQ9 QUESTIONS 1 AND 2: 0
1. LITTLE INTEREST OR PLEASURE IN DOING THINGS: NOT AT ALL
2. FEELING DOWN, DEPRESSED OR HOPELESS: NOT AT ALL

## 2025-01-27 NOTE — PROGRESS NOTES
Subjective   Patient ID: Juan Antonio Araujo is a 69 y.o. male who presents for No chief complaint on file..    Patient presents for wellness exam and follow-up.  He has been out of Jardiance for the past week.  He has been noncompliant with his diet and exercise.  He reports that his sugars are in the low to mid 100s.  He denies any headaches, no dizziness does complain Kater allergy symptoms.  He denies any chest pain or shortness of breath, no abdominal pain no nausea vomiting or diarrhea.  He reports baseline back pain.  Reports baseline knee pain.  It limits his exercise         Review of Systems   Constitutional:  Negative for activity change, appetite change, chills, diaphoresis and fatigue.   HENT:  Negative for congestion, dental problem, drooling, ear discharge, ear pain, facial swelling, hearing loss, mouth sores, nosebleeds, postnasal drip, rhinorrhea, sinus pressure, sinus pain, sneezing, sore throat, tinnitus, trouble swallowing and voice change.    Eyes:  Negative for photophobia, pain, discharge, redness, itching and visual disturbance.   Respiratory:  Negative for cough, choking, chest tightness, shortness of breath, wheezing and stridor.    Cardiovascular:  Negative for chest pain, palpitations and leg swelling.   Gastrointestinal:  Negative for abdominal distention, abdominal pain, anal bleeding, blood in stool, constipation, diarrhea, nausea, rectal pain and vomiting.   Endocrine: Negative for cold intolerance, heat intolerance, polydipsia, polyphagia and polyuria.   Genitourinary:  Negative for decreased urine volume, difficulty urinating, dysuria, enuresis, flank pain, frequency, genital sores, hematuria and urgency.   Musculoskeletal:  Negative for arthralgias, back pain, gait problem, joint swelling, myalgias, neck pain and neck stiffness.   Skin:  Negative for color change, pallor, rash and wound.   Neurological:  Negative for dizziness, tremors, seizures, syncope, facial asymmetry, speech  difficulty, weakness, light-headedness, numbness and headaches.   Hematological:  Negative for adenopathy. Does not bruise/bleed easily.   Psychiatric/Behavioral:  Negative for sleep disturbance.        Objective   There were no vitals taken for this visit.    Physical Exam  Constitutional:       Appearance: Normal appearance.   Cardiovascular:      Rate and Rhythm: Normal rate and regular rhythm.      Heart sounds: No murmur heard.     No gallop.   Pulmonary:      Effort: No respiratory distress.      Breath sounds: No wheezing or rales.   Abdominal:      General: There is no distension.      Palpations: There is no mass.      Tenderness: There is no abdominal tenderness. There is no guarding.   Musculoskeletal:      Right lower leg: No edema.      Left lower leg: No edema.   Neurological:      Mental Status: He is alert.         Assessment/Plan   Diagnoses and all orders for this visit:  Diabetes mellitus type 2 without retinopathy (Multi)-hemoglobin A1c was 8.9.  Refuses insulin.  Risk of poor diabetic control explained.  He will consider start Mounjaro.  Will reach out to the pharmacy team.  -     POCT Fingerstick Glucose manually resulted  -     POCT glycosylated hemoglobin (Hb A1C) manually resulted  Obesity, morbid (Multi)-is encouraged to diet and exercise  Chronic kidney disease, stage 3a (Multi)-recheck creatinine next visit  Benign essential hypertension-low-salt diet and exercise  Familial combined hyperlipidemia-we will schedule cardiac score.  Continue with atorvastatin  -     CT cardiac scoring wo IV contrast; Future  Spinal stenosis of lumbar region without neurogenic claudication-stable symptoms  Health maintenance-will schedule eye and dental appointment.  Colonoscopy has been done.  He will get a tetanus RSV and shingles vaccine at the pharmacy.  Advance care planning discussed.

## 2025-01-28 ENCOUNTER — HOSPITAL ENCOUNTER (OUTPATIENT)
Dept: RADIOLOGY | Facility: CLINIC | Age: 70
Discharge: HOME | End: 2025-01-28
Payer: MEDICARE

## 2025-01-28 DIAGNOSIS — E78.49 FAMILIAL COMBINED HYPERLIPIDEMIA: ICD-10-CM

## 2025-01-28 PROCEDURE — 75571 CT HRT W/O DYE W/CA TEST: CPT

## 2025-02-14 ENCOUNTER — PREP FOR PROCEDURE (OUTPATIENT)
Dept: UROLOGY | Facility: CLINIC | Age: 70
End: 2025-02-14
Payer: MEDICARE

## 2025-02-14 DIAGNOSIS — C61 MALIGNANT NEOPLASM OF PROSTATE (MULTI): Primary | ICD-10-CM

## 2025-02-14 DIAGNOSIS — R97.20 ELEVATED PSA: ICD-10-CM

## 2025-02-17 PROBLEM — C61 MALIGNANT NEOPLASM OF PROSTATE (MULTI): Status: ACTIVE | Noted: 2025-02-14

## 2025-02-19 ENCOUNTER — APPOINTMENT (OUTPATIENT)
Dept: PREADMISSION TESTING | Facility: HOSPITAL | Age: 70
End: 2025-02-19
Payer: MEDICARE

## 2025-02-21 ENCOUNTER — CLINICAL SUPPORT (OUTPATIENT)
Dept: PREADMISSION TESTING | Facility: HOSPITAL | Age: 70
End: 2025-02-21
Payer: MEDICARE

## 2025-02-21 DIAGNOSIS — C61 MALIGNANT NEOPLASM OF PROSTATE (MULTI): ICD-10-CM

## 2025-02-21 DIAGNOSIS — R97.20 ELEVATED PSA: ICD-10-CM

## 2025-02-21 NOTE — CPM/PAT NURSE NOTE
CPM/PAT Nurse Note      Name: Juan Antonio Araujo (Juan Antonio Araujo)  /Age: 1955/69 y.o.       Past Medical History:   Diagnosis Date    CKD (chronic kidney disease)     10/21/24 BUN 22, Creatinine 1.48, GFR 51    Essential (primary) hypertension 2013    Benign essential hypertension    Hyperlipidemia     10/21/24 chol 140, LDL 72, Triglycerides 190    Lower abdominal pain, unspecified     Groin pain    Lumbar radiculopathy     Obesity     BMI 37.61    Personal history of other endocrine, nutritional and metabolic disease     History of hyperlipidemia    Sciatica     Spinal stenosis     Type 2 diabetes mellitus     25 A1c 8.9. blood sugar at home typically 140's       Past Surgical History:   Procedure Laterality Date    COLONOSCOPY  2016    Complete Colonoscopy    COLONOSCOPY  2021    rpt     ROTATOR CUFF REPAIR Bilateral     Rotator Cuff Repair       Patient  has no history on file for sexual activity.    Family History   Problem Relation Name Age of Onset    Diabetes Mother      Pneumonia Father      Diabetes Brother         No Known Allergies    Prior to Admission medications    Medication Sig Start Date End Date Taking? Authorizing Provider   aspirin 81 mg EC tablet Take 1 tablet (81 mg) by mouth once daily. 18   Historical Provider, MD   atorvastatin (Lipitor) 80 mg tablet Take 1 tablet (80 mg) by mouth once daily. 9/10/24   Yogesh Garcia MD   empagliflozin (Jardiance) 25 mg Take 1 tablet (25 mg) by mouth once daily. 10/24/23   Yogesh Garcia MD   glimepiride (Amaryl) 4 mg tablet Take 1 tablet (4 mg) by mouth 2 times a day. 9/10/24   Yogesh Garcia MD   lancets (OneTouch UltraSoft Lancets) misc Test twice daily.    Historical Provider, MD   lancets 33 gauge misc Use to test blood sugars twice daily 23   Yogesh Garcia MD   lidocaine (Lidoderm) 5 % patch Place 1 patch over 12 hours on the skin once daily. Apply to painful area 12 hours per day, remove for 12  hours.  Patient not taking: Reported on 2/21/2025 1/27/25 1/27/26  Yogesh Garcia MD   lisinopriL-hydrochlorothiazide 20-25 mg tablet TAKE 1 TABLET BY MOUTH EVERY DAY 1/27/25   Yogesh Garcia MD   OneTouch Ultra Test strip Ultra Blue Strips: TEST BLOOD SUGAR TWICE A DAY 5/18/18   Historical Provider, MD   tamsulosin (Flomax) 0.4 mg 24 hr capsule Take 1 capsule (0.4 mg) by mouth once daily.  Patient not taking: Reported on 2/21/2025 8/20/24 5/17/25  Dario Barraza MD   tirzepatide (Mounjaro) 2.5 mg/0.5 mL pen injector Inject 2.5 mg under the skin 1 (one) time per week.  Patient not taking: Reported on 2/21/2025 10/21/24   Yogesh Garcia MD        PAT ROS     DASI Risk Score    No data to display       Caprini DVT Assessment    No data to display       Modified Frailty Index    No data to display       CHADS2 Stroke Risk  Current as of yesterday        N/A 3 to 100%: High Risk   2 to < 3%: Medium Risk   0 to < 2%: Low Risk     Last Change: N/A          This score determines the patient's risk of having a stroke if the patient has atrial fibrillation.        This score is not applicable to this patient. Components are not calculated.          Revised Cardiac Risk Index    No data to display       Apfel Simplified Score    No data to display       Risk Analysis Index Results This Encounter    No data found in the last 10 encounters.       Prodigy: High Risk  Total Score: 20              Prodigy Age Score      Prodigy Gender Score          ARISCAT Score for Postoperative Pulmonary Complications    No data to display       Moore Perioperative Risk for Myocardial Infarction or Cardiac Arrest (KAMRAN)    No data to display         Nurse Plan of Action:     RN screening call complete.  Reviewed allergies, medications and pharmacy, medical, surgical and social history with patient.  Chart updated.  Instructed patient to stop mounjaro 7 days prior (states not currently taking) and Jardiance 3 days prior to surgery.

## 2025-02-28 ENCOUNTER — PRE-ADMISSION TESTING (OUTPATIENT)
Dept: PREADMISSION TESTING | Facility: HOSPITAL | Age: 70
End: 2025-02-28
Payer: MEDICARE

## 2025-02-28 ENCOUNTER — APPOINTMENT (OUTPATIENT)
Dept: PREADMISSION TESTING | Facility: HOSPITAL | Age: 70
End: 2025-02-28
Payer: MEDICARE

## 2025-02-28 ENCOUNTER — APPOINTMENT (OUTPATIENT)
Dept: LAB | Facility: HOSPITAL | Age: 70
End: 2025-02-28
Payer: MEDICARE

## 2025-02-28 VITALS
BODY MASS INDEX: 36.36 KG/M2 | HEART RATE: 82 BPM | OXYGEN SATURATION: 97 % | TEMPERATURE: 97.3 F | RESPIRATION RATE: 18 BRPM | WEIGHT: 218.26 LBS | DIASTOLIC BLOOD PRESSURE: 58 MMHG | HEIGHT: 65 IN | SYSTOLIC BLOOD PRESSURE: 123 MMHG

## 2025-02-28 DIAGNOSIS — Z01.818 ENCOUNTER FOR OTHER PREPROCEDURAL EXAMINATION: Primary | ICD-10-CM

## 2025-02-28 DIAGNOSIS — R73.9 HYPERGLYCEMIA, UNSPECIFIED: ICD-10-CM

## 2025-02-28 DIAGNOSIS — R73.9 ELEVATED BLOOD SUGAR: ICD-10-CM

## 2025-02-28 DIAGNOSIS — Z01.818 PREOP TESTING: Primary | ICD-10-CM

## 2025-02-28 LAB
ANION GAP SERPL CALC-SCNC: 10 MMOL/L (ref 10–20)
ATRIAL RATE: 80 BPM
BASOPHILS # BLD AUTO: 0.06 X10*3/UL (ref 0–0.1)
BASOPHILS NFR BLD AUTO: 0.9 %
BUN SERPL-MCNC: 19 MG/DL (ref 6–23)
CALCIUM SERPL-MCNC: 9.1 MG/DL (ref 8.6–10.3)
CHLORIDE SERPL-SCNC: 103 MMOL/L (ref 98–107)
CO2 SERPL-SCNC: 32 MMOL/L (ref 21–32)
CREAT SERPL-MCNC: 1.57 MG/DL (ref 0.5–1.3)
EGFRCR SERPLBLD CKD-EPI 2021: 47 ML/MIN/1.73M*2
EOSINOPHIL # BLD AUTO: 0.13 X10*3/UL (ref 0–0.7)
EOSINOPHIL NFR BLD AUTO: 2 %
ERYTHROCYTE [DISTWIDTH] IN BLOOD BY AUTOMATED COUNT: 13.7 % (ref 11.5–14.5)
EST. AVERAGE GLUCOSE BLD GHB EST-MCNC: 194 MG/DL
GLUCOSE SERPL-MCNC: 149 MG/DL (ref 74–99)
HBA1C MFR BLD: 8.4 %
HCT VFR BLD AUTO: 48.6 % (ref 41–52)
HGB BLD-MCNC: 15.8 G/DL (ref 13.5–17.5)
IMM GRANULOCYTES # BLD AUTO: 0.03 X10*3/UL (ref 0–0.7)
IMM GRANULOCYTES NFR BLD AUTO: 0.5 % (ref 0–0.9)
LYMPHOCYTES # BLD AUTO: 2.09 X10*3/UL (ref 1.2–4.8)
LYMPHOCYTES NFR BLD AUTO: 31.8 %
MCH RBC QN AUTO: 28.7 PG (ref 26–34)
MCHC RBC AUTO-ENTMCNC: 32.5 G/DL (ref 32–36)
MCV RBC AUTO: 88 FL (ref 80–100)
MONOCYTES # BLD AUTO: 0.52 X10*3/UL (ref 0.1–1)
MONOCYTES NFR BLD AUTO: 7.9 %
NEUTROPHILS # BLD AUTO: 3.74 X10*3/UL (ref 1.2–7.7)
NEUTROPHILS NFR BLD AUTO: 56.9 %
NRBC BLD-RTO: 0 /100 WBCS (ref 0–0)
P AXIS: 53 DEGREES
P OFFSET: 198 MS
P ONSET: 142 MS
PLATELET # BLD AUTO: 122 X10*3/UL (ref 150–450)
POTASSIUM SERPL-SCNC: 4.1 MMOL/L (ref 3.5–5.3)
PR INTERVAL: 166 MS
Q ONSET: 225 MS
QRS COUNT: 13 BEATS
QRS DURATION: 104 MS
QT INTERVAL: 404 MS
QTC CALCULATION(BAZETT): 465 MS
QTC FREDERICIA: 445 MS
R AXIS: -8 DEGREES
RBC # BLD AUTO: 5.51 X10*6/UL (ref 4.5–5.9)
SODIUM SERPL-SCNC: 141 MMOL/L (ref 136–145)
T AXIS: 27 DEGREES
T OFFSET: 427 MS
VENTRICULAR RATE: 80 BPM
WBC # BLD AUTO: 6.6 X10*3/UL (ref 4.4–11.3)

## 2025-02-28 PROCEDURE — 93010 ELECTROCARDIOGRAM REPORT: CPT | Performed by: INTERNAL MEDICINE

## 2025-02-28 PROCEDURE — 36415 COLL VENOUS BLD VENIPUNCTURE: CPT

## 2025-02-28 PROCEDURE — 93005 ELECTROCARDIOGRAM TRACING: CPT | Performed by: PHYSICIAN ASSISTANT

## 2025-02-28 PROCEDURE — 99204 OFFICE O/P NEW MOD 45 MIN: CPT | Performed by: PHYSICIAN ASSISTANT

## 2025-02-28 PROCEDURE — 85025 COMPLETE CBC W/AUTO DIFF WBC: CPT

## 2025-02-28 PROCEDURE — 83036 HEMOGLOBIN GLYCOSYLATED A1C: CPT

## 2025-02-28 PROCEDURE — 80048 BASIC METABOLIC PNL TOTAL CA: CPT

## 2025-02-28 ASSESSMENT — ENCOUNTER SYMPTOMS
DIFFICULTY URINATING: 0
BRUISES/BLEEDS EASILY: 0
NAUSEA: 0
LIMITED RANGE OF MOTION: 0
RHINORRHEA: 0
VISUAL CHANGE: 0
ARTHRALGIAS: 1
COUGH: 0
ABDOMINAL PAIN: 0
MYALGIAS: 0
EXCESSIVE BLEEDING: 0
NUMBNESS: 0
TROUBLE SWALLOWING: 0
UNEXPECTED WEIGHT CHANGE: 0
EYE PAIN: 0
DYSURIA: 0
WOUND: 0
WEAKNESS: 0
SKIN CHANGES: 0
CONSTIPATION: 0
EYE DISCHARGE: 0
ABDOMINAL DISTENTION: 0
CHILLS: 0
FEVER: 0
BLOOD IN STOOL: 0
WHEEZING: 0
HEMOPTYSIS: 0
LIGHT-HEADEDNESS: 0
PALPITATIONS: 0
SHORTNESS OF BREATH: 0
DYSPNEA WITH EXERTION: 0
CONFUSION: 0
SINUS CONGESTION: 0
DYSPNEA AT REST: 0
NECK STIFFNESS: 0
DOUBLE VISION: 0
VOMITING: 0
NECK PAIN: 0
DIARRHEA: 0

## 2025-02-28 NOTE — PREPROCEDURE INSTRUCTIONS
Medication List            Accurate as of February 28, 2025  7:59 AM. Always use your most recent med list.                aspirin 81 mg EC tablet  Medication Adjustments for Surgery: Take on the morning of surgery     atorvastatin 80 mg tablet  Commonly known as: Lipitor  Take 1 tablet (80 mg) by mouth once daily.  Medication Adjustments for Surgery: Take on the morning of surgery     empagliflozin 25 mg  Commonly known as: Jardiance  Take 1 tablet (25 mg) by mouth once daily.  Medication Adjustments for Surgery: Take last dose 3 days before surgery  Notes to patient: Last dose 3/10/25     glimepiride 4 mg tablet  Commonly known as: Amaryl  Take 1 tablet (4 mg) by mouth 2 times a day.  Medication Adjustments for Surgery: Do Not take on the morning of surgery     lisinopriL-hydrochlorothiazide 20-25 mg tablet  TAKE 1 TABLET BY MOUTH EVERY DAY  Notes to patient: HOLD evening prior to surgery and morning of surgery        OneTouch Ultra Test strip  Generic drug: blood sugar diagnostic     * OneTouch UltraSoft Lancets misc  Generic drug: lancets     * lancets 33 gauge misc  Use to test blood sugars twice daily     tamsulosin 0.4 mg 24 hr capsule  Commonly known as: Flomax  Take 1 capsule (0.4 mg) by mouth once daily.  Medication Adjustments for Surgery: Take/Use as prescribed           * This list has 2 medication(s) that are the same as other medications prescribed for you. Read the directions carefully, and ask your doctor or other care provider to review them with you.                              **Concerning above medication instructions, if medication is normally taken at night, continue normal schedule.**  **DO NOT TAKE NIGHT PRIOR AND MORNING OF SURGERY**    CONTACT SURGEON'S OFFICE IF YOU DEVELOP:  * Fever = 100.4 F   * New respiratory symptoms (e.g. cough, shortness of breath, respiratory distress, sore throat)  * Recent loss of taste or smell  *Flu like symptoms such as headache, fatigue or  gastrointestinal symptoms  * You develop any open sores, shingles, burning or painful urination   AND/OR:  * You no longer wish to have the surgery.  * Any other personal circumstances change that may lead to the need to cancel or defer this surgery.  *You were admitted to any hospital within one week of your planned procedure.    SMOKING:  *Quitting smoking can make a huge difference to your health and recovery from surgery.    *If you need help with quitting, call 0-424-QUIT-NOW.    THE DAY OF SURGERY:  *Do not eat any food after midnight the night before surgery.   *You must drink 13.5 ounces of clear liquids (i.e. water, black coffee (no milk or cream), tea, apple juice or electrolyte drinks (gatorade)) 2 hours before your arrival time.  *You may chew gum until 2 hours before your surgery    SURGICAL TIME  *You will be contacted between 2 p.m. and 6 p.m. the business day before your surgery with your arrival time.  *If you haven't received a call by 6pm, call [phone].  *Scheduled surgery times may change and you will be notified if this occurs-check your personal voicemail for any updates.    ON THE MORNING OF SURGERY:  *Wear comfortable, loose fitting clothing.   *Do not use moisturizers, creams, lotions or perfume.  *All jewelry and valuables should be left at home.  *Prosthetic devices such as contact lenses, hearing aids, dentures, eyelash extensions, hairpins and body piercing must be removed before surgery.    BRING WITH YOU:  *Photo ID and insurance card  *Current list of medicines and allergies  *Pacemaker/Defibrillator/Heart stent cards  *CPAP machine and mask  *Slings/splints/crutches  *Copy of your complete Advanced Directive/DHPOA-if applicable  *Neurostimulator implant remote    PARKING AND ARRIVAL:  *Check in at the Main Entrance desk and let them know you are here for surgery.  *You will be directed to the 2nd floor surgical waiting area.    AFTER OUTPATIENT SURGERY:  *A responsible adult  MUST accompany you at the time of discharge and stay with you for 24 hours after your surgery.  *You may NOT drive yourself home after surgery.  *You may use a taxi or ride sharing service ([employer], Uber) to return home ONLY if you are accompanied by a friend or family member.  *Instructions for resuming your medications will be provided by your surgeon.       Preoperative Deep Breathing Exercises  Why it is important to do deep breathing exercises before my surgery?  Deep breathing exercises strengthen your breathing muscles.  This helps you to recover after your surgery and decreases the chance of breathing complications.  How are the deep breathing exercises done?  Sit straight with your back supported.  Breathe in deeply and slowly through your nose. Your lower rib cage should expand and your abdomen may move forward.  Hold that breath for 3 to 5 seconds.  Breathe out through pursed lips, slowly and completely.  Rest and repeat 10 times every hour while awake.  Rest longer if you become dizzy or lightheaded.           Preoperative Brain Exercises    What are brain exercises?  A brain exercise is any activity that engages your thinking (cognitive) skills.    What types of activities are considered brain exercises?  Jigsaw puzzles, crossword puzzles, word jumble, memory games, word search, and many more.  Many can be found free online or on your phone via a mobile bety.    Why should I do brain exercises before my surgery?  More recent research has shown brain exercise before surgery can lower the risk of postoperative delirium (confusion) which can be especially important for older adults.  Patients who did brain exercises for 5 to 10 hours the days before surgery, cut their risk of postoperative delirium in half up to 1 week after surgery.

## 2025-02-28 NOTE — CPM/PAT H&P
[facility] Evaluation       Name: Juan Antonio Araujo (Juan Antonio Araujo)  /Age: 1955/69 y.o.         Date of Consult: 25    Referring Provider: Dr. Barraza    Surgery, Date, and Length:     Transperineal Prostate Biopsy   3/14/25, 30MIN    Juan Antonio Araujo is a 69 y.o. year-old male who [facility] perioperative risk assessment prior to surgery.    Patient presents with a primary diagnosis of elevated PSA. Most recent PSA was 5.33 on 10/21/24.  MRI 24 shows 96g prostate and PI-RADS4 lesion in posteromedial transitional zone.  Pt denies any family history of prostate cancer.  He has never been a smoker.     This note was created in part upon personal review of patient's medical records.      Patient is scheduled to have Transperineal Prostate Biopsy      Pt denies any past history of anesthetic complications such as PONV, awareness, prolonged sedation, dental damage, aspiration, cardiac arrest, difficult intubation, difficult I.V. access or unexpected hospital admissions.  NO malignant hyperthermia and or pseudocholinesterase deficiency.  No history of blood transfusions     The patient is not a Vood[employer] and will accept blood and blood products if medically indicated.   Type and screen sent.      Past Medical History:   Diagnosis Date    CKD (chronic kidney disease)     10/21/24 BUN 22, Creatinine 1.48, GFR 51    Essential (primary) hypertension 2013    Benign essential hypertension    Hyperlipidemia     10/21/24 chol 140, LDL 72, Triglycerides 190    Lower abdominal pain, unspecified     Groin pain    Lumbar radiculopathy     Obesity     BMI 37.61    Personal history of other endocrine, nutritional and metabolic disease     History of hyperlipidemia    Sciatica     Spinal stenosis     Type 2 diabetes mellitus     25 A1c 8.9. blood sugar at home typically 140's       Past Surgical History:   Procedure Laterality Date    COLONOSCOPY  2016    Complete Colonoscopy     COLONOSCOPY  2021    rpt     ROTATOR CUFF REPAIR Bilateral     Rotator Cuff Repair       Patient  has no history on file for sexual activity.    Family History   Problem Relation Name Age of Onset    Diabetes Mother      Pneumonia Father      Diabetes Brother       Social History     Socioeconomic History    Marital status:      Spouse name: Not on file    Number of children: 3    Years of education: Not on file    Highest education level: Not on file   Occupational History    Occupation: ,    Tobacco Use    Smoking status: Never    Smokeless tobacco: Never    Tobacco comments:     Cigar on occasion- few times a year    Vaping Use    Vaping status: Never Used   Substance and Sexual Activity    Alcohol use: Yes     Alcohol/week: 2.0 standard drinks of alcohol     Types: 2 Shots of liquor per week     Comment: around once a month    Drug use: Never    Sexual activity: Not on file   Other Topics Concern    Not on file   Social History Narrative    Not on file     Social Drivers of Health     Financial Resource Strain: Not on file   Food Insecurity: Not on file   Transportation Needs: Not on file   Physical Activity: Inactive (2023)    Exercise Vital Sign     Days of Exercise per Week: 0 days     Minutes of Exercise per Session: 0 min   Stress: Not on file   Social Connections: Not on file   Intimate Partner Violence: Not on file   Housing Stability: Not on file        No Known Allergies    Current Outpatient Medications   Medication Instructions    aspirin 81 mg EC tablet 1 tablet, Daily    atorvastatin (LIPITOR) 80 mg, oral, Daily    empagliflozin (JARDIANCE) 25 mg, oral, Daily    glimepiride (AMARYL) 4 mg, oral, 2 times daily    lancets (OneTouch UltraSoft Lancets) misc Test twice daily.    lancets 33 gauge misc Use to test blood sugars twice daily    lisinopriL-hydrochlorothiazide 20-25 mg tablet 1 tablet, oral, Daily    OneTouch Ultra Test strip Ultra Blue Strips: TEST BLOOD  SUGAR TWICE A DAY    tamsulosin (FLOMAX) 0.4 mg, oral, Daily           PAT ROS:   Constitutional:    no fever   no chills   no unexpected weight change  Neuro/Psych:    no numbness   no weakness   no light-headedness   no confusion  Eyes:    no discharge   no pain   no vision loss   no diplopia   no visual disturbance   use of corrective lenses  Ears:    no ear pain   no hearing loss   no tinnitus  Nose:    no nasal discharge   no sinus congestion   no epistaxis  Mouth:    no dental issues   no mouth pain   no oral bleeding   no mouth lesions  Throat:    no throat pain   no dysphagia  Neck:    no neck pain   no neck stiffness  Cardio:    Functional 4 Mets. Patient denies SOB walking up 2 flights of stairs   Walking, elliptical 10 minutes three days per week  Cooking, cleaning, grocery shopping   no chest pain   no palpitations   no peripheral edema   no dyspnea   no FONTENOT  Respiratory:    no cough   no wheezing   no hemoptysis   no shortness of breath  Endocrine:    no cold intolerance   no heat intolerance  GI:    no abdominal distention   no abdominal pain   no constipation   no diarrhea   no nausea   no vomiting   no blood in stool  :    no difficulty urinating   no dysuria   no oliguria  Musculoskeletal:    arthralgias (b/l knees and hips)   no myalgias   no decreased ROM  Hematologic:    does not bruise/bleed easily   no excessive bleeding   no history of blood transfusion   no blood clots  Skin:   no skin changes   no sores/wound   no rash      Physical Exam  Constitutional:       General: He is not in acute distress.     Appearance: Normal appearance. He is obese. He is not ill-appearing, toxic-appearing or diaphoretic.   HENT:      Head: Normocephalic and atraumatic.      Nose: Nose normal. No congestion or rhinorrhea.      Mouth/Throat:      Mouth: Mucous membranes are moist.      Pharynx: No posterior oropharyngeal erythema.   Eyes:      Extraocular Movements: Extraocular movements intact.       "Conjunctiva/sclera: Conjunctivae normal.   Cardiovascular:      Rate and Rhythm: Normal rate and regular rhythm.      Pulses: Normal pulses.      Heart sounds: Normal heart sounds. No murmur heard.     No friction rub. No gallop.   Pulmonary:      Effort: Pulmonary effort is normal. No respiratory distress.      Breath sounds: Normal breath sounds. No stridor. No wheezing, rhonchi or rales.   Abdominal:      General: Bowel sounds are normal. There is no distension.      Palpations: Abdomen is soft. There is no mass.      Tenderness: There is no abdominal tenderness. There is no guarding or rebound.      Hernia: No hernia is present.   Musculoskeletal:         General: No swelling, tenderness, deformity or signs of injury. Normal range of motion.      Cervical back: Normal range of motion and neck supple. No rigidity or tenderness.   Skin:     General: Skin is warm and dry.      Coloration: Skin is not jaundiced or pale.      Findings: No bruising, erythema, lesion or rash.   Neurological:      General: No focal deficit present.      Mental Status: He is alert and oriented to person, place, and time.      Cranial Nerves: No cranial nerve deficit.      Sensory: No sensory deficit.      Motor: No weakness.      Coordination: Coordination normal.   Psychiatric:         Mood and Affect: Mood normal.         Behavior: Behavior normal.          PAT AIRWAY:   Airway:     Mallampati::  I    Neck ROM::  Full   One missing tooth top right ; no loose teeth           Visit Vitals  /58   Pulse 82   Temp 36.3 °C (97.3 °F)   Resp 18   Ht 1.651 m (5' 5\")   Wt 99 kg (218 lb 4.1 oz)   SpO2 97%   BMI 36.32 kg/m²   Smoking Status Never   BSA 2.13 m²      LABS:  Lab Results   Component Value Date    WBC 6.6 02/28/2025    HGB 15.8 02/28/2025    HCT 48.6 02/28/2025    MCV 88 02/28/2025     (L) 02/28/2025      Lab Results   Component Value Date    GLUCOSE 149 (H) 02/28/2025    CALCIUM 9.1 02/28/2025     02/28/2025    K " 4.1 02/28/2025    CO2 32 02/28/2025     02/28/2025    BUN 19 02/28/2025    CREATININE 1.57 (H) 02/28/2025        EKG 2/28/25  NSR  Inferior infarct, age undetermined  Abnormal EKG  Vent rate = 80 bpm    CT cardiac score 1/28/25  FINDINGS:  Within limitation from motion artifacts the score and distribution of  calcium in the coronary arteries is as follows:      LM 0,  LAD 3.4, proximal vessel  LCx 0,  RCA 7.8, proximal vessel      Total   11.2      Assessment and Plan:     69 y.o.  male  scheduled for Transperineal Prostate Biopsy on 3/14/25 with Dr. Barraza for elevated PSA.   Presents to CPM today for perioperative risk stratification and optimization      Cardiovascular:  Patient has no active cardiac symptoms.   Patient denies any chest pain, tightness, heaviness, pressure, radiating pain, palpitations, irregular heartbeats, lightheadedness, cough, congestion, shortness of breath, FONTENOT, PND, near syncope, weight loss or gain.    METS: 4+  RCRI: 0 points, 3.9%  risk for postoperative MACE     HTN - lisinopril/hydrochlorothiazide HOLD evening prior to surgery and morning of surgery     Encouraged lifestyle modifications, low-sodium diet, and increase activity as tolerated.  Monitor BP and follow up with managing physician for readings sustaining >140/90.    HLD - pt not currently on any lipid lowering meds    CAD - per CT cardiac score of 11 (?) - cont ASA 81mg on dos     Pulmonary:  No pulmonary diagnosis, however patient is at increased risk of perioperative complications secondary to  age > 60, obesity  Stop Bang score is 4 placing patient at high risk for MICHEL  ARISCAT: <26 points, 1.6% risk of in-hospital postoperative pulmonary complication  PRODIGY: High risk for opioid induced respiratory depression    **Pt provided with deep breathing exercises, incentive spirometer and instructions for use during PAT visit today**    Suspect MICHEL - MICHEL-Patient asked to follow up with PCP for suspected MICHEL. Recommend  prioritizing  nonopioid analgesic techniques (regional and local anesthesia, nonsteroidal medications, etc) before the administration of opioids and close monitoring for hypoventilation after surgery due to suspected MICHEL. If intravenous narcotics are needed beyond the immediate porfirio-operative period, the patient may benefit from continuous pulse oximetry to monitor for hypoxic events till baseline Sp02 is normal on room air and  a respiratory therapy evaluation.    Endocrine:  DMII - per PCP note, pt refusing insulin; hold jardiance 3 days before surgery; hold mounjaro 7 days before surgery  A1c 1/27/25 = 8.9%    Renal:  CKD3A  Recommendations to avoid nephrotoxic drugs and carefully monitor fluid status to maintain euvolemia. Use dose adjusted medications as needed for the underlying level of renal function.  2/28/25 crt 1.57; GFR 47    Neuro:  No neurologic diagnosis, however, the patient is at increased risk for perioperative delirium secondary to  age, polypharmacy, renal insufficiency    Hematology:  Thrombocytopenia  4/1/24   2/28/25     Patient instructed to ambulate as soon as possible postoperatively to decrease thromboembolic risk.   Initiate mechanical DVT prophylaxis as soon as possible and initiate chemical prophylaxis when deemed safe from a bleeding standpoint post surgery.     LABS: CBC, BMP, A1c and EKG ordered. Lab results reviewed and show ongoing thrombocytopenia which is stable.     Followup: A1c pending    Caprini: 6    Risk assessment complete.  Patient is scheduled for a low surgical risk procedure.        Preoperative medication instructions were provided and reviewed with the patient.  Any additional testing or evaluation was explained to the patient.  Nothing by mouth instructions were discussed and patient's questions were answered prior to conclusion to this encounter.  Patient verbalized understanding of preoperative instructions given in preadmission testing; discharge  instructions available in EMR.    This note was dictated by a speech recognition.  Minor errors may have been detected in a speech recognition.

## 2025-02-28 NOTE — H&P (VIEW-ONLY)
[facility] Evaluation       Name: Juan Antonio Araujo (Juan Antonio Araujo)  /Age: 1955/69 y.o.         Date of Consult: 25    Referring Provider: Dr. Barraza    Surgery, Date, and Length:     Transperineal Prostate Biopsy   3/14/25, 30MIN    Juan Antonio Araujo is a 69 y.o. year-old male who presents to the [facility] for perioperative risk assessment prior to surgery.    Patient presents with a primary diagnosis of elevated PSA. Most recent PSA was 5.33 on 10/21/24.  MRI 24 shows 96g prostate and PI-RADS4 lesion in posteromedial transitional zone.  Pt denies any family history of prostate cancer.  He has never been a smoker.     This note was created in part upon personal review of patient's medical records.      Patient is scheduled to have Transperineal Prostate Biopsy      Pt denies any past history of anesthetic complications such as PONV, awareness, prolonged sedation, dental damage, aspiration, cardiac arrest, difficult intubation, difficult I.V. access or unexpected hospital admissions.  NO malignant hyperthermia and or pseudocholinesterase deficiency.  No history of blood transfusions     The patient is not a Gnostic[employer] and will accept blood and blood products if medically indicated.   Type and screen sent.      Past Medical History:   Diagnosis Date    CKD (chronic kidney disease)     10/21/24 BUN 22, Creatinine 1.48, GFR 51    Essential (primary) hypertension 2013    Benign essential hypertension    Hyperlipidemia     10/21/24 chol 140, LDL 72, Triglycerides 190    Lower abdominal pain, unspecified     Groin pain    Lumbar radiculopathy     Obesity     BMI 37.61    Personal history of other endocrine, nutritional and metabolic disease     History of hyperlipidemia    Sciatica     Spinal stenosis     Type 2 diabetes mellitus     25 A1c 8.9. blood sugar at home typically 140's       Past Surgical History:   Procedure Laterality Date    COLONOSCOPY  2016    Complete Colonoscopy     COLONOSCOPY  2021    rpt     ROTATOR CUFF REPAIR Bilateral     Rotator Cuff Repair       Patient  has no history on file for sexual activity.    Family History   Problem Relation Name Age of Onset    Diabetes Mother      Pneumonia Father      Diabetes Brother       Social History     Socioeconomic History    Marital status:      Spouse name: Not on file    Number of children: 3    Years of education: Not on file    Highest education level: Not on file   Occupational History    Occupation: ,    Tobacco Use    Smoking status: Never    Smokeless tobacco: Never    Tobacco comments:     Cigar on occasion- few times a year    Vaping Use    Vaping status: Never Used   Substance and Sexual Activity    Alcohol use: Yes     Alcohol/week: 2.0 standard drinks of alcohol     Types: 2 Shots of liquor per week     Comment: around once a month    Drug use: Never    Sexual activity: Not on file   Other Topics Concern    Not on file   Social History Narrative    Not on file     Social Drivers of Health     Financial Resource Strain: Not on file   Food Insecurity: Not on file   Transportation Needs: Not on file   Physical Activity: Inactive (2023)    Exercise Vital Sign     Days of Exercise per Week: 0 days     Minutes of Exercise per Session: 0 min   Stress: Not on file   Social Connections: Not on file   Intimate Partner Violence: Not on file   Housing Stability: Not on file        No Known Allergies    Current Outpatient Medications   Medication Instructions    aspirin 81 mg EC tablet 1 tablet, Daily    atorvastatin (LIPITOR) 80 mg, oral, Daily    empagliflozin (JARDIANCE) 25 mg, oral, Daily    glimepiride (AMARYL) 4 mg, oral, 2 times daily    lancets (OneTouch UltraSoft Lancets) misc Test twice daily.    lancets 33 gauge misc Use to test blood sugars twice daily    lisinopriL-hydrochlorothiazide 20-25 mg tablet 1 tablet, oral, Daily    OneTouch Ultra Test strip Ultra Blue Strips: TEST BLOOD  SUGAR TWICE A DAY    tamsulosin (FLOMAX) 0.4 mg, oral, Daily           PAT ROS:   Constitutional:    no fever   no chills   no unexpected weight change  Neuro/Psych:    no numbness   no weakness   no light-headedness   no confusion  Eyes:    no discharge   no pain   no vision loss   no diplopia   no visual disturbance   use of corrective lenses  Ears:    no ear pain   no hearing loss   no tinnitus  Nose:    no nasal discharge   no sinus congestion   no epistaxis  Mouth:    no dental issues   no mouth pain   no oral bleeding   no mouth lesions  Throat:    no throat pain   no dysphagia  Neck:    no neck pain   no neck stiffness  Cardio:    Functional 4 Mets. Patient denies SOB walking up 2 flights of stairs   Walking, elliptical 10 minutes three days per week  Cooking, cleaning, grocery shopping   no chest pain   no palpitations   no peripheral edema   no dyspnea   no FONTENOT  Respiratory:    no cough   no wheezing   no hemoptysis   no shortness of breath  Endocrine:    no cold intolerance   no heat intolerance  GI:    no abdominal distention   no abdominal pain   no constipation   no diarrhea   no nausea   no vomiting   no blood in stool  :    no difficulty urinating   no dysuria   no oliguria  Musculoskeletal:    arthralgias (b/l knees and hips)   no myalgias   no decreased ROM  Hematologic:    does not bruise/bleed easily   no excessive bleeding   no history of blood transfusion   no blood clots  Skin:   no skin changes   no sores/wound   no rash      Physical Exam  Constitutional:       General: He is not in acute distress.     Appearance: Normal appearance. He is obese. He is not ill-appearing, toxic-appearing or diaphoretic.   HENT:      Head: Normocephalic and atraumatic.      Nose: Nose normal. No congestion or rhinorrhea.      Mouth/Throat:      Mouth: Mucous membranes are moist.      Pharynx: No posterior oropharyngeal erythema.   Eyes:      Extraocular Movements: Extraocular movements intact.       "Conjunctiva/sclera: Conjunctivae normal.   Cardiovascular:      Rate and Rhythm: Normal rate and regular rhythm.      Pulses: Normal pulses.      Heart sounds: Normal heart sounds. No murmur heard.     No friction rub. No gallop.   Pulmonary:      Effort: Pulmonary effort is normal. No respiratory distress.      Breath sounds: Normal breath sounds. No stridor. No wheezing, rhonchi or rales.   Abdominal:      General: Bowel sounds are normal. There is no distension.      Palpations: Abdomen is soft. There is no mass.      Tenderness: There is no abdominal tenderness. There is no guarding or rebound.      Hernia: No hernia is present.   Musculoskeletal:         General: No swelling, tenderness, deformity or signs of injury. Normal range of motion.      Cervical back: Normal range of motion and neck supple. No rigidity or tenderness.   Skin:     General: Skin is warm and dry.      Coloration: Skin is not jaundiced or pale.      Findings: No bruising, erythema, lesion or rash.   Neurological:      General: No focal deficit present.      Mental Status: He is alert and oriented to person, place, and time.      Cranial Nerves: No cranial nerve deficit.      Sensory: No sensory deficit.      Motor: No weakness.      Coordination: Coordination normal.   Psychiatric:         Mood and Affect: Mood normal.         Behavior: Behavior normal.          PAT AIRWAY:   Airway:     Mallampati::  I    Neck ROM::  Full   One missing tooth top right ; no loose teeth           Visit Vitals  /58   Pulse 82   Temp 36.3 °C (97.3 °F)   Resp 18   Ht 1.651 m (5' 5\")   Wt 99 kg (218 lb 4.1 oz)   SpO2 97%   BMI 36.32 kg/m²   Smoking Status Never   BSA 2.13 m²      LABS:  Lab Results   Component Value Date    WBC 6.6 02/28/2025    HGB 15.8 02/28/2025    HCT 48.6 02/28/2025    MCV 88 02/28/2025     (L) 02/28/2025      Lab Results   Component Value Date    GLUCOSE 149 (H) 02/28/2025    CALCIUM 9.1 02/28/2025     02/28/2025    K " 4.1 02/28/2025    CO2 32 02/28/2025     02/28/2025    BUN 19 02/28/2025    CREATININE 1.57 (H) 02/28/2025      Lab Results   Component Value Date    HGBA1C 8.4 (H) 02/28/2025        EKG 2/28/25  NSR  Inferior infarct, age undetermined  Abnormal EKG  Vent rate = 80 bpm    CT cardiac score 1/28/25  FINDINGS:  Within limitation from motion artifacts the score and distribution of  calcium in the coronary arteries is as follows:      LM 0,  LAD 3.4, proximal vessel  LCx 0,  RCA 7.8, proximal vessel      Total   11.2      Assessment and Plan:     69 y.o.  male  scheduled for Transperineal Prostate Biopsy on 3/14/25 with Dr. Barraza for elevated PSA.   Presents to [facility] today for perioperative risk stratification and optimization      Cardiovascular:  Patient has no active cardiac symptoms.   Patient denies any chest pain, tightness, heaviness, pressure, radiating pain, palpitations, irregular heartbeats, lightheadedness, cough, congestion, shortness of breath, FONTENOT, PND, near syncope, weight loss or gain.    METS: 4+  RCRI: 0 points, 3.9%  risk for postoperative MACE     HTN - lisinopril/hydrochlorothiazide HOLD evening prior to surgery and morning of surgery     Encouraged lifestyle modifications, low-sodium diet, and increase activity as tolerated.  Monitor BP and follow up with managing physician for readings sustaining >140/90.    HLD - pt not currently on any lipid lowering meds    CAD - per CT cardiac score of 11 (?) - cont ASA 81mg on dos     Pulmonary:  No pulmonary diagnosis, however patient is at increased risk of perioperative complications secondary to  age > 60, obesity  Stop Bang score is 4 placing patient at high risk for MICHEL  ARISCAT: <26 points, 1.6% risk of in-hospital postoperative pulmonary complication  PRODIGY: High risk for opioid induced respiratory depression    **Pt provided with deep breathing exercises, incentive spirometer and instructions for use during PAT visit today**    Suspect MICHEL -  MICHEL-Patient asked to follow up with PCP for suspected MICHEL. Recommend prioritizing  nonopioid analgesic techniques (regional and local anesthesia, nonsteroidal medications, etc) before the administration of opioids and close monitoring for hypoventilation after surgery due to suspected MICHEL. If intravenous narcotics are needed beyond the immediate porfirio-operative period, the patient may benefit from continuous pulse oximetry to monitor for hypoxic events till baseline Sp02 is normal on room air and  a respiratory therapy evaluation.    Endocrine:  DMII - per PCP note, pt refusing insulin; hold jardiance 3 days before surgery; hold mounjaro 7 days before surgery  A1c 1/27/25 = 8.9%; 2/28/25 = 8.4%    Renal:  CKD3A  Recommendations to avoid nephrotoxic drugs and carefully monitor fluid status to maintain euvolemia. Use dose adjusted medications as needed for the underlying level of renal function.  2/28/25 crt 1.57; GFR 47    Neuro:  No neurologic diagnosis, however, the patient is at increased risk for perioperative delirium secondary to  age, polypharmacy, renal insufficiency    Hematology:  Thrombocytopenia  4/1/24   2/28/25     Patient instructed to ambulate as soon as possible postoperatively to decrease thromboembolic risk.   Initiate mechanical DVT prophylaxis as soon as possible and initiate chemical prophylaxis when deemed safe from a bleeding standpoint post surgery.     LABS: CBC, BMP, A1c and EKG ordered. Lab results reviewed and show ongoing thrombocytopenia which is stable.     Followup: A1c pending    Addendum 3/3/25:   A1c results reviewed and elevated at 8.4%; Dr. Garcia and Dr. Barraza notified via email today    Caprini: 6    Risk assessment complete.  Patient is scheduled for a low surgical risk procedure.        Preoperative medication instructions were provided and reviewed with the patient.  Any additional testing or evaluation was explained to the patient.  Nothing by mouth  instructions were discussed and patient's questions were answered prior to conclusion to this encounter.  Patient verbalized understanding of preoperative instructions given in preadmission testing; discharge instructions available in EMR.    This note was dictated by a speech recognition.  Minor errors may have been detected in a speech recognition.

## 2025-03-13 NOTE — DISCHARGE INSTRUCTIONS
[facility] [place]    DISCHARGE INSTRUCTIONS     Prostate Biopsy    Juan Antonio Araujo        Call [phone] during regular daytime business hours (8:00 am - 5:00 pm) and after 5:00 pm and ask for the Urology Resident with any questions or concerns.      If it is a life-threatening situation, proceed to the nearest emergency department.        Follow-up appointment:  3/31/25     Thank you for the opportunity to care for you today.  Your health and healing are very important to us.  We hope we made you feel as comfortable as possible and are committed to your recovery and continued well-being.      The following is a brief overview of your prostate biopsy today. Some of the information contained on this summary may be confidential.  This information should be kept in your records and should be shared with your regular doctor.    Physicians:   Dr. Barraza      Procedure performed: prostate biopsy  Pending results:   prostate pathology results    What to Expect During your Recovery and Home Care  Anesthesia Side Effects   You received anesthesia today.  You may feel sleepy, tired, or have a sore throat.   You may also feel drowsiness, dizziness, or inability to think clearly.  For your safety, do not drive, drink alcoholic beverages, take any unprescribed medication or make any important decisions for 24 hours.  A responsible adult should be with you for 24 hours.        Activity and Recovery    No heavy lifting or strenuous activity for several days. Avoid activities that put pressure on your rectal area. Do not have sex for a few days.      Pain Control  Unfortunately, you may experience pain after your procedure.  Adequate management can include alternative measures to help ease your pain and can include ice packs, and over the counter Tylenol can be taken as prescribed as needed for breakthrough pain. Do not take more then 4,000mg of Tylenol in a 24-hour period.      Nausea/Vomiting   Clear liquids are best  tolerated at first. Start slow, advance your diet as tolerated to normal foods. Avoid spicy, greasy, heavy foods at first. Also, you may feel nauseous or like you need to vomit if you take any type of medication on an empty stomach.  Call your physician if you are unable to eat or drink and have persistent vomiting.    Signs of Bleeding   You may have blood in your urine and semen for several weeks.     Treatment/wound care:   It is okay to shower 24 hours after time of surgery.      Signs of Infection  Signs of infection can include fever, burning sensation with urination, frequency, urgency or severe pain.  If you see any of these occur, please contact your doctor's office at [phone].  Any fever higher than 100.4, especially if associated with an ill feeling, abdominal pain, chills, or nausea should be reported to your surgeon.      Assist in bowel movements/urination  Increase fiber in diet  Urination should occur within 6 hours of anesthesia.   Increase water (6 to 8 glasses)  Increase walking   If you have tried these methods and your bladder still feels full and you cannot use the bathroom, please go to your nearest Emergency room.    Additional Instructions:   Try not to strain when going to the bathroom. Do not hold your urine. We will go over your biopsy results at your follow up appointment. It takes 7-10 business days for the results to come back.

## 2025-03-14 ENCOUNTER — ANESTHESIA (OUTPATIENT)
Dept: OPERATING ROOM | Facility: HOSPITAL | Age: 70
End: 2025-03-14
Payer: MEDICARE

## 2025-03-14 ENCOUNTER — HOSPITAL ENCOUNTER (OUTPATIENT)
Facility: HOSPITAL | Age: 70
Setting detail: OUTPATIENT SURGERY
Discharge: HOME | End: 2025-03-14
Attending: STUDENT IN AN ORGANIZED HEALTH CARE EDUCATION/TRAINING PROGRAM | Admitting: STUDENT IN AN ORGANIZED HEALTH CARE EDUCATION/TRAINING PROGRAM
Payer: MEDICARE

## 2025-03-14 ENCOUNTER — ANESTHESIA EVENT (OUTPATIENT)
Dept: OPERATING ROOM | Facility: HOSPITAL | Age: 70
End: 2025-03-14
Payer: MEDICARE

## 2025-03-14 VITALS
SYSTOLIC BLOOD PRESSURE: 122 MMHG | RESPIRATION RATE: 16 BRPM | TEMPERATURE: 96.8 F | WEIGHT: 224.43 LBS | DIASTOLIC BLOOD PRESSURE: 70 MMHG | HEART RATE: 73 BPM | HEIGHT: 65 IN | BODY MASS INDEX: 37.39 KG/M2 | OXYGEN SATURATION: 95 %

## 2025-03-14 DIAGNOSIS — C61 MALIGNANT NEOPLASM OF PROSTATE (MULTI): Primary | ICD-10-CM

## 2025-03-14 DIAGNOSIS — R97.20 ELEVATED PSA: ICD-10-CM

## 2025-03-14 LAB — GLUCOSE BLD MANUAL STRIP-MCNC: 139 MG/DL (ref 74–99)

## 2025-03-14 PROCEDURE — C1819 TISSUE LOCALIZATION-EXCISION: HCPCS | Performed by: STUDENT IN AN ORGANIZED HEALTH CARE EDUCATION/TRAINING PROGRAM

## 2025-03-14 PROCEDURE — 2500000004 HC RX 250 GENERAL PHARMACY W/ HCPCS (ALT 636 FOR OP/ED): Performed by: STUDENT IN AN ORGANIZED HEALTH CARE EDUCATION/TRAINING PROGRAM

## 2025-03-14 PROCEDURE — A4649 SURGICAL SUPPLIES: HCPCS | Performed by: STUDENT IN AN ORGANIZED HEALTH CARE EDUCATION/TRAINING PROGRAM

## 2025-03-14 PROCEDURE — 55706 BX PRST8 NDL SAT SAMPLING: CPT | Performed by: STUDENT IN AN ORGANIZED HEALTH CARE EDUCATION/TRAINING PROGRAM

## 2025-03-14 PROCEDURE — 2720000007 HC OR 272 NO HCPCS: Performed by: STUDENT IN AN ORGANIZED HEALTH CARE EDUCATION/TRAINING PROGRAM

## 2025-03-14 PROCEDURE — 3700000002 HC GENERAL ANESTHESIA TIME - EACH INCREMENTAL 1 MINUTE: Performed by: STUDENT IN AN ORGANIZED HEALTH CARE EDUCATION/TRAINING PROGRAM

## 2025-03-14 PROCEDURE — 3700000001 HC GENERAL ANESTHESIA TIME - INITIAL BASE CHARGE: Performed by: STUDENT IN AN ORGANIZED HEALTH CARE EDUCATION/TRAINING PROGRAM

## 2025-03-14 PROCEDURE — 82947 ASSAY GLUCOSE BLOOD QUANT: CPT

## 2025-03-14 PROCEDURE — G0416 PROSTATE BIOPSY, ANY MTHD: HCPCS | Performed by: PATHOLOGY

## 2025-03-14 PROCEDURE — 2500000004 HC RX 250 GENERAL PHARMACY W/ HCPCS (ALT 636 FOR OP/ED): Performed by: NURSE ANESTHETIST, CERTIFIED REGISTERED

## 2025-03-14 PROCEDURE — 7100000010 HC PHASE TWO TIME - EACH INCREMENTAL 1 MINUTE: Performed by: STUDENT IN AN ORGANIZED HEALTH CARE EDUCATION/TRAINING PROGRAM

## 2025-03-14 PROCEDURE — 88344 IMHCHEM/IMCYTCHM EA MLT ANTB: CPT | Mod: TC | Performed by: STUDENT IN AN ORGANIZED HEALTH CARE EDUCATION/TRAINING PROGRAM

## 2025-03-14 PROCEDURE — 2500000005 HC RX 250 GENERAL PHARMACY W/O HCPCS: Performed by: STUDENT IN AN ORGANIZED HEALTH CARE EDUCATION/TRAINING PROGRAM

## 2025-03-14 PROCEDURE — 7100000002 HC RECOVERY ROOM TIME - EACH INCREMENTAL 1 MINUTE: Performed by: STUDENT IN AN ORGANIZED HEALTH CARE EDUCATION/TRAINING PROGRAM

## 2025-03-14 PROCEDURE — 7100000001 HC RECOVERY ROOM TIME - INITIAL BASE CHARGE: Performed by: STUDENT IN AN ORGANIZED HEALTH CARE EDUCATION/TRAINING PROGRAM

## 2025-03-14 PROCEDURE — 7100000009 HC PHASE TWO TIME - INITIAL BASE CHARGE: Performed by: STUDENT IN AN ORGANIZED HEALTH CARE EDUCATION/TRAINING PROGRAM

## 2025-03-14 PROCEDURE — 88344 IMHCHEM/IMCYTCHM EA MLT ANTB: CPT | Performed by: PATHOLOGY

## 2025-03-14 PROCEDURE — 3600000007 HC OR TIME - EACH INCREMENTAL 1 MINUTE - PROCEDURE LEVEL TWO: Performed by: STUDENT IN AN ORGANIZED HEALTH CARE EDUCATION/TRAINING PROGRAM

## 2025-03-14 PROCEDURE — 3600000002 HC OR TIME - INITIAL BASE CHARGE - PROCEDURE LEVEL TWO: Performed by: STUDENT IN AN ORGANIZED HEALTH CARE EDUCATION/TRAINING PROGRAM

## 2025-03-14 RX ORDER — ONDANSETRON HYDROCHLORIDE 2 MG/ML
INJECTION, SOLUTION INTRAVENOUS AS NEEDED
Status: DISCONTINUED | OUTPATIENT
Start: 2025-03-14 | End: 2025-03-14

## 2025-03-14 RX ORDER — SODIUM CHLORIDE, SODIUM LACTATE, POTASSIUM CHLORIDE, CALCIUM CHLORIDE 600; 310; 30; 20 MG/100ML; MG/100ML; MG/100ML; MG/100ML
INJECTION, SOLUTION INTRAVENOUS CONTINUOUS PRN
Status: DISCONTINUED | OUTPATIENT
Start: 2025-03-14 | End: 2025-03-14

## 2025-03-14 RX ORDER — INDOMETHACIN 25 MG/1
CAPSULE ORAL AS NEEDED
Status: DISCONTINUED | OUTPATIENT
Start: 2025-03-14 | End: 2025-03-14 | Stop reason: HOSPADM

## 2025-03-14 RX ORDER — PROPOFOL 10 MG/ML
INJECTION, EMULSION INTRAVENOUS AS NEEDED
Status: DISCONTINUED | OUTPATIENT
Start: 2025-03-14 | End: 2025-03-14

## 2025-03-14 RX ORDER — FENTANYL CITRATE 50 UG/ML
50 INJECTION, SOLUTION INTRAMUSCULAR; INTRAVENOUS EVERY 5 MIN PRN
Status: CANCELLED | OUTPATIENT
Start: 2025-03-14

## 2025-03-14 RX ORDER — MIDAZOLAM HYDROCHLORIDE 1 MG/ML
INJECTION INTRAMUSCULAR; INTRAVENOUS AS NEEDED
Status: DISCONTINUED | OUTPATIENT
Start: 2025-03-14 | End: 2025-03-14

## 2025-03-14 RX ORDER — FENTANYL CITRATE 50 UG/ML
INJECTION, SOLUTION INTRAMUSCULAR; INTRAVENOUS AS NEEDED
Status: DISCONTINUED | OUTPATIENT
Start: 2025-03-14 | End: 2025-03-14

## 2025-03-14 RX ORDER — ACETAMINOPHEN 325 MG/1
650 TABLET ORAL EVERY 4 HOURS PRN
Status: CANCELLED | OUTPATIENT
Start: 2025-03-14

## 2025-03-14 RX ORDER — BUPIVACAINE HYDROCHLORIDE 5 MG/ML
INJECTION, SOLUTION PERINEURAL AS NEEDED
Status: DISCONTINUED | OUTPATIENT
Start: 2025-03-14 | End: 2025-03-14 | Stop reason: HOSPADM

## 2025-03-14 RX ORDER — LIDOCAINE HYDROCHLORIDE 10 MG/ML
INJECTION, SOLUTION EPIDURAL; INFILTRATION; INTRACAUDAL; PERINEURAL AS NEEDED
Status: DISCONTINUED | OUTPATIENT
Start: 2025-03-14 | End: 2025-03-14 | Stop reason: HOSPADM

## 2025-03-14 RX ORDER — KETOROLAC TROMETHAMINE 30 MG/ML
INJECTION, SOLUTION INTRAMUSCULAR; INTRAVENOUS AS NEEDED
Status: DISCONTINUED | OUTPATIENT
Start: 2025-03-14 | End: 2025-03-14

## 2025-03-14 RX ORDER — SODIUM CHLORIDE, SODIUM LACTATE, POTASSIUM CHLORIDE, CALCIUM CHLORIDE 600; 310; 30; 20 MG/100ML; MG/100ML; MG/100ML; MG/100ML
100 INJECTION, SOLUTION INTRAVENOUS CONTINUOUS
Status: CANCELLED | OUTPATIENT
Start: 2025-03-14 | End: 2025-03-15

## 2025-03-14 RX ORDER — LIDOCAINE HYDROCHLORIDE 10 MG/ML
0.1 INJECTION, SOLUTION EPIDURAL; INFILTRATION; INTRACAUDAL; PERINEURAL ONCE
Status: CANCELLED | OUTPATIENT
Start: 2025-03-14 | End: 2025-03-14

## 2025-03-14 RX ORDER — LIDOCAINE HYDROCHLORIDE 10 MG/ML
INJECTION, SOLUTION INFILTRATION; PERINEURAL AS NEEDED
Status: DISCONTINUED | OUTPATIENT
Start: 2025-03-14 | End: 2025-03-14

## 2025-03-14 RX ORDER — ONDANSETRON HYDROCHLORIDE 2 MG/ML
4 INJECTION, SOLUTION INTRAVENOUS ONCE AS NEEDED
Status: CANCELLED | OUTPATIENT
Start: 2025-03-14

## 2025-03-14 RX ORDER — METOCLOPRAMIDE HYDROCHLORIDE 5 MG/ML
10 INJECTION INTRAMUSCULAR; INTRAVENOUS ONCE AS NEEDED
Status: CANCELLED | OUTPATIENT
Start: 2025-03-14

## 2025-03-14 RX ORDER — BACITRACIN ZINC 500 UNIT/G
OINTMENT IN PACKET (EA) TOPICAL AS NEEDED
Status: DISCONTINUED | OUTPATIENT
Start: 2025-03-14 | End: 2025-03-14 | Stop reason: HOSPADM

## 2025-03-14 RX ADMIN — ONDANSETRON 4 MG: 2 INJECTION, SOLUTION INTRAMUSCULAR; INTRAVENOUS at 07:39

## 2025-03-14 RX ADMIN — LIDOCAINE HYDROCHLORIDE 5 ML: 10 INJECTION, SOLUTION INFILTRATION; PERINEURAL at 07:36

## 2025-03-14 RX ADMIN — DEXAMETHASONE SODIUM PHOSPHATE 4 MG: 4 INJECTION, SOLUTION INTRAMUSCULAR; INTRAVENOUS at 07:39

## 2025-03-14 RX ADMIN — SODIUM CHLORIDE, POTASSIUM CHLORIDE, SODIUM LACTATE AND CALCIUM CHLORIDE: 600; 310; 30; 20 INJECTION, SOLUTION INTRAVENOUS at 07:30

## 2025-03-14 RX ADMIN — KETOROLAC TROMETHAMINE 15 MG: 30 INJECTION, SOLUTION INTRAMUSCULAR at 07:48

## 2025-03-14 RX ADMIN — MIDAZOLAM HYDROCHLORIDE 2 MG: 1 INJECTION, SOLUTION INTRAMUSCULAR; INTRAVENOUS at 07:32

## 2025-03-14 RX ADMIN — PROPOFOL 100 MG: 10 INJECTION, EMULSION INTRAVENOUS at 07:36

## 2025-03-14 RX ADMIN — FENTANYL CITRATE 50 MCG: 50 INJECTION, SOLUTION INTRAMUSCULAR; INTRAVENOUS at 07:36

## 2025-03-14 SDOH — HEALTH STABILITY: MENTAL HEALTH: CURRENT SMOKER: 1

## 2025-03-14 ASSESSMENT — PAIN SCALES - GENERAL
PAINLEVEL_OUTOF10: 0 - NO PAIN

## 2025-03-14 ASSESSMENT — COLUMBIA-SUICIDE SEVERITY RATING SCALE - C-SSRS
2. HAVE YOU ACTUALLY HAD ANY THOUGHTS OF KILLING YOURSELF?: NO
6. HAVE YOU EVER DONE ANYTHING, STARTED TO DO ANYTHING, OR PREPARED TO DO ANYTHING TO END YOUR LIFE?: NO
1. IN THE PAST MONTH, HAVE YOU WISHED YOU WERE DEAD OR WISHED YOU COULD GO TO SLEEP AND NOT WAKE UP?: NO

## 2025-03-14 ASSESSMENT — PAIN - FUNCTIONAL ASSESSMENT
PAIN_FUNCTIONAL_ASSESSMENT: 0-10
PAIN_FUNCTIONAL_ASSESSMENT: UNABLE TO SELF-REPORT
PAIN_FUNCTIONAL_ASSESSMENT: 0-10
PAIN_FUNCTIONAL_ASSESSMENT: 0-10

## 2025-03-14 NOTE — OP NOTE
Transperineal Prostate Biopsy Operative Note     Date: 3/14/2025  OR Location: [facility] OR    Name: Juan Antonio Araujo, : 1955, Age: 69 y.o., MRN: [id number], Sex: male    Diagnosis  Pre-op Diagnosis      * Malignant neoplasm of prostate (Multi) [C61]     * Elevated PSA [R97.20] Post-op Diagnosis     * Malignant neoplasm of prostate (Multi) [C61]     * Elevated PSA [R97.20]     Procedures  Transperineal Prostate Biopsy  05958 - MI PROSTATE NEEDLE BIOPSY ANY APPROACH    MI BIOPSY OF PROSTATE,NEEDLE,TRANSPERINEAL [W22691]  CHG US TRANSRECTAL [88506]  Surgeons      * Dario Barraza - Primary    Resident/Fellow/Other Assistant:  Surgeons and Role:     * Jacquie Cai MD PhD - Resident - Assisting    Staff:   Sindy: Lucía Serrano Person: Jelani  Circulator: Rosmery    Anesthesia Staff: Anesthesiologist: Rj Molina MD  CRNA: DEWAYNE Aguilera-CRNA    Procedure Summary  Anesthesia: Monitor Anesthesia Care  ASA: III  Estimated Blood Loss:  <1 mL  Intra-op Medications: Administrations occurring from 0800 to 0830 on 25:  * No intraprocedure medications in log *           Anesthesia Record               Intraprocedure I/O Totals          Intake    LR infusion 400.00 mL    Total Intake 400 mL          Specimen:   ID Type Source Tests Collected by Time   1 : Left Paramedian [place] Tissue PROSTATE NEEDLE BIOPSY LEFT SURGICAL PATHOLOGY EXAM Dario Barraza MD 3/14/2025 0703   2 : Right Anterior Tissue PROSTATE NEEDLE BIOPSY RIGHT SURGICAL PATHOLOGY EXAM Dario Barraza MD 3/14/2025 0703   3 : Right Lateral Tissue PROSTATE NEEDLE BIOPSY RIGHT SURGICAL PATHOLOGY EXAM Dario Barraza MD 3/14/2025 0703   4 : Right Posterior Base Tissue PROSTATE NEEDLE BIOPSY RIGHT SURGICAL PATHOLOGY EXAM Dario Barraza MD 3/14/2025 0703   5 : Right Posterior [place] Tissue PROSTATE NEEDLE BIOPSY RIGHT SURGICAL PATHOLOGY EXAM Dario Barraza MD 3/14/2025 0703   6 : Right Paramedian Base Tissue PROSTATE NEEDLE BIOPSY RIGHT SURGICAL  PATHOLOGY EXAM Dario Barraza MD 3/14/2025 0703   7 : Right Paramedian [place] Tissue PROSTATE NEEDLE BIOPSY RIGHT SURGICAL PATHOLOGY EXAM Dario Barraza MD 3/14/2025 0703   8 : Left Anterior Tissue PROSTATE NEEDLE BIOPSY LEFT SURGICAL PATHOLOGY EXAM Dario Barraza MD 3/14/2025 0703   9 : Left Lateral Tissue PROSTATE NEEDLE BIOPSY LEFT SURGICAL PATHOLOGY EXAM Dario Barraza MD 3/14/2025 0703   10 : Left Paramedian Base Tissue PROSTATE NEEDLE BIOPSY LEFT SURGICAL PATHOLOGY EXAM Dario Barraza MD 3/14/2025 0703   11 : Left Posterior [place] Tissue PROSTATE NEEDLE BIOPSY LEFT SURGICAL PATHOLOGY EXAM Dario Barraza MD 3/14/2025 0703   12 : Left Posterior Base Tissue PROSTATE NEEDLE BIOPSY LEFT SURGICAL PATHOLOGY EXAM Dario Barraza MD 3/14/2025 0703   13 : ONEIDA #1 Tissue PROSTATE BIOPSY TARGETED ONEIDA SURGICAL PATHOLOGY EXAM Dario Barraza MD 3/14/2025 0725                 Preoperative Diagnosis: Elevated PSA; abnormal prostate findings on MRI     Postoperative Diagnosis: Same     Operation Performed: MR/TRUS fusion guided biopsy via transperineal approach      Anesthesia: Sedation and Local     EBL: Minimal      Complications: None      Indications for procedure: This 69 y.o. year old male presents with a history of Elevated PSA.     MRI Findings: PIRADS 5           Procedure and Findings:      The patient was seen in the preoperative area. The risks, benefits, complications, treatment options, non-operative alternatives, expected recovery and outcomes were discussed with the patient. The possibilities of reaction to medication, pulmonary aspiration, injury to surrounding structures, bleeding, recurrent infection, the need for additional procedures, failure to diagnose a condition, and creating a complication requiring transfusion or operation were discussed with the patient. The patient concurred with the proposed plan, giving informed consent.  The site of surgery was properly noted/marked if necessary per policy. The patient  has been actively warmed in preoperative area. Preoperative antibiotics are not indicated. Venous thrombosis prophylaxis are not indicated.        The patient was placed in lithotomy position. A injection mixture of lidocaine, marcaine and sodium bicarbonate was used as local anesthetic for the skin of the perineum and to perform a periapical block.      The electromagnetic sensor was attached to the ultrasound probe. The ultrasound transducer was placed into the rectum. Positioning of the probe and sensor within the generated EM-field was confirmed.      A comprehensive TRUS survey was performed with the prostate visualized in both the transverse and sagittal planes. There were benign calcifications seen on ultrasound. In addition to the MRI fusion, there were hypoechoic lesions suspicious for tumor identified on ultrasound.      3D-Rendering with trus image processing and fusion:     In the axial plane, an ultrasound sweep of the prostate was completed from base to apex. The serial axial image slices were marked by annotating the anterior, posterior, left, right, base and apical points for semi-automatic segmentation of the prostate. Manual adjustments of the prostate US segmentation was performed in the axial, sagittal and coronal views rendering a 3-D data set/US volume.      Initial rotational co-registration was performed by blending MR images that were overlaid on the US images. The urethra, bladder neck, bladder and posterior surface of the prostate were identified on both MRI and US.      The images were rotated to ensure corresponding anatomy was correctly aligned.      Elastic registration was then calculated for use if required.      The MRI and Ultrasound were then registered using co-display of the images verifying that base, apex, left and right sides of the prostate were correctly aligned. Manual corrections were performed where need. Additional co-registration of the internal fiducials including  prostatic cyst and the pubis were performed.      Prostate Biopsy:      At this point, accurate co-registration/fusion was confirmed. Ultrasound was used to navigate to the centroid target and lesion volume. The segmented ROIs were targeted and biopsied with ultrasound guidance taking four cores from each target. I was satisfied with the location of the targeted biopsies obtained. An additional 12 core, standard, systematic random biopsy was performed for a total of 16 cores of tissue obtained during this biopsy session.       Disposition  Patient tolerated the procedure well and will follow-up for an outpatient appointment to discuss pathology.      Indications: Juan Antonio Araujo is an 69 y.o. male who is having surgery for Malignant neoplasm of prostate (Multi) [C61]  Elevated PSA [R97.20].     The patient was seen in the preoperative area. The risks, benefits, complications, treatment options, non-operative alternatives, expected recovery and outcomes were discussed with the patient. The possibilities of reaction to medication, pulmonary aspiration, injury to surrounding structures, bleeding, recurrent infection, the need for additional procedures, failure to diagnose a condition, and creating a complication requiring transfusion or operation were discussed with the patient. The patient concurred with the proposed plan, giving informed consent.  The site of surgery was properly noted/marked if necessary per policy. The patient has been actively warmed in preoperative area. Preoperative antibiotics are not indicated. Venous thrombosis prophylaxis are not indicated.    The rational for transrectal ultrasound and biopsy of the prostate including risk, benefits and alternatives were discussed. These included risk of increased urination, urinary retention, hematuria, hematospermia, and urosepsis, the patient elected to proceed.       The multiparametric MRI data was imported from the radiology PACS system to the [place]  biopsy platform. The T2-weighted images were reviewed including the segmented prostate boundary and pre-identified suspicious lesions (ROIs).      A procedure time out confirmed the proper patient, and the procedure informed consent form had been signed by the patient.      Procedure Details:   The patient was placed in lithotomy position. A injection mixture of lidocaine, marcaine and sodium bicarbonate was used as local anesthetic for the skin of the perineum and to perform a periapical block.      The electromagnetic sensor was attached to the ultrasound probe. The ultrasound transducer was placed into the rectum. Positioning of the probe and sensor within the generated EM-field was confirmed.      A comprehensive TRUS survey was performed with the prostate visualized in both the transverse and sagittal planes. 3D-Rendering with trus image processing and fusion.     In the axial plane, an ultrasound sweep of the prostate was completed from base to apex. The serial axial image slices were marked by annotating the anterior, posterior, left, right, base and apical points for semi-automatic segmentation of the prostate. Manual adjustments of the prostate US segmentation was performed in the axial, sagittal and coronal views rendering a 3-D data set/US volume.      Initial rotational co-registration was performed by blending MR images that were overlaid on the US images. The urethra, bladder neck, bladder and posterior surface of the prostate were identified on both MRI and US.      The images were rotated to ensure corresponding anatomy was correctly aligned.      Elastic registration was then calculated for use if required.      The MRI and Ultrasound were then registered using co-display of the images verifying that base, apex, left and right sides of the prostate were correctly aligned. Manual corrections were performed where need.       At this point, accurate co-registration/fusion was confirmed. Ultrasound was  used to navigate to the centroid target and lesion volume. The segmented ONEIDA was targeted and biopsied with ultrasound guidance taking four cores from the target. An additional 12 core, standard, systematic random biopsy was performed for a total of 16 cores of tissue obtained during this biopsy session.      This concluded the procedure, which the patient tolerated well. The patient was transported to PACU without issue.    Complications:  None; patient tolerated the procedure well.    Disposition: PACU - hemodynamically stable.  Condition: stable       Attending Attestation: I was present and scrubbed for the entire procedure.    Dario Barraza  Phone Number: [phone]

## 2025-03-14 NOTE — ANESTHESIA PREPROCEDURE EVALUATION
Patient: Juan Antonio Araujo    Procedure Information       Date/Time: 03/14/25 0800    Procedure: Transperineal Prostate Biopsy (Prostate)    Location: [facility] A OR 04 / [facility] A OR    Surgeons: Dario Barraza MD            Relevant Problems   Anesthesia (within normal limits)      Cardiac   (+) Benign essential hypertension   (+) Familial combined hyperlipidemia      Pulmonary (within normal limits)      Neuro   (+) Lumbar radiculopathy   (+) Sciatica, left side      GI (within normal limits)      /Renal   (+) Malignant neoplasm of prostate (Multi)      Liver (within normal limits)      Endocrine   (+) Diabetes mellitus type 2 without retinopathy (Multi)   (+) Obesity, morbid (Multi)   (+) Type 2 diabetes mellitus with chronic kidney disease, without long-term current use of insulin, unspecified CKD stage (Multi)      Hematology (within normal limits)      Musculoskeletal   (+) Spinal stenosis of lumbar region      HEENT (within normal limits)      ID   (+) Herpes genitalia      Skin (within normal limits)       Clinical information reviewed:   Tobacco  Allergies  Meds   Med Hx  Surg Hx   Fam Hx  Soc Hx        NPO Detail:  NPO/Void Status  Carbohydrate Drink Given Prior to Surgery? : N  Date of Last Liquid: 03/14/25  Time of Last Liquid: 0500  Date of Last Solid: 03/13/25  Time of Last Solid: 2000  Last Intake Type: Clear fluids  Time of Last Void: 0630         Physical Exam    Airway  Mallampati: I  TM distance: >3 FB  Neck ROM: full     Cardiovascular - normal exam     Dental - normal exam     Pulmonary - normal exam     Abdominal - normal exam         Anesthesia Plan    History of general anesthesia?: yes  History of complications of general anesthesia?: no    ASA 3     MAC     The patient is a current smoker.  Patient was previously instructed to abstain from smoking on day of procedure.  Patient did not smoke on day of procedure.    intravenous induction   Anesthetic plan and risks discussed with  patient.  Use of blood products discussed with patient who consented to blood products.

## 2025-03-14 NOTE — ANESTHESIA POSTPROCEDURE EVALUATION
Patient: Juan Antonio Araujo    Procedure Summary       Date: 03/14/25 Room / Location: [facility] A OR 04 / [facility]    Anesthesia Start: 0730 Anesthesia Stop: 0753    Procedure: Transperineal Prostate Biopsy (Prostate) Diagnosis:       Malignant neoplasm of prostate (Multi)      Elevated PSA      (Malignant neoplasm of prostate (Multi) [C61])      (Elevated PSA [R97.20])    Surgeons: Dario Barraza MD Responsible Provider: Rj Molina MD    Anesthesia Type: MAC ASA Status: 3            Anesthesia Type: MAC    Vitals Value Taken Time   /64 03/14/25 0804   Temp 36 °C (96.8 °F) 03/14/25 0751   Pulse 73 03/14/25 0813   Resp 12 03/14/25 0800   SpO2 94 % 03/14/25 0813   Vitals shown include unfiled device data.    Anesthesia Post Evaluation    Patient location during evaluation: bedside  Patient participation: complete - patient participated  Level of consciousness: awake and alert  Pain management: adequate  Airway patency: patent  Cardiovascular status: acceptable  Respiratory status: acceptable  Hydration status: acceptable  Postoperative Nausea and Vomiting: none    No notable events documented.

## 2025-03-26 LAB
LAB AP ASR DISCLAIMER: NORMAL
LAB AP BLOCK FOR ADDITIONAL STUDIES: NORMAL
LABORATORY COMMENT REPORT: NORMAL
PATH REPORT.FINAL DX SPEC: NORMAL
PATH REPORT.GROSS SPEC: NORMAL
PATH REPORT.RELEVANT HX SPEC: NORMAL
PATH REPORT.TOTAL CANCER: NORMAL

## 2025-03-31 ENCOUNTER — OFFICE VISIT (OUTPATIENT)
Dept: UROLOGY | Facility: HOSPITAL | Age: 70
End: 2025-03-31
Payer: MEDICARE

## 2025-03-31 DIAGNOSIS — C61 MALIGNANT NEOPLASM OF PROSTATE (MULTI): ICD-10-CM

## 2025-03-31 DIAGNOSIS — R97.20 ELEVATED PSA: ICD-10-CM

## 2025-03-31 DIAGNOSIS — N40.1 HYPERPLASIA OF PROSTATE WITH LOWER URINARY TRACT SYMPTOMS (LUTS): ICD-10-CM

## 2025-03-31 PROCEDURE — 1036F TOBACCO NON-USER: CPT | Performed by: STUDENT IN AN ORGANIZED HEALTH CARE EDUCATION/TRAINING PROGRAM

## 2025-03-31 PROCEDURE — 99214 OFFICE O/P EST MOD 30 MIN: CPT | Performed by: STUDENT IN AN ORGANIZED HEALTH CARE EDUCATION/TRAINING PROGRAM

## 2025-03-31 PROCEDURE — 3052F HG A1C>EQUAL 8.0%<EQUAL 9.0%: CPT | Performed by: STUDENT IN AN ORGANIZED HEALTH CARE EDUCATION/TRAINING PROGRAM

## 2025-03-31 PROCEDURE — 1159F MED LIST DOCD IN RCRD: CPT | Performed by: STUDENT IN AN ORGANIZED HEALTH CARE EDUCATION/TRAINING PROGRAM

## 2025-03-31 PROCEDURE — G2211 COMPLEX E/M VISIT ADD ON: HCPCS | Performed by: STUDENT IN AN ORGANIZED HEALTH CARE EDUCATION/TRAINING PROGRAM

## 2025-03-31 RX ORDER — TAMSULOSIN HYDROCHLORIDE 0.4 MG/1
0.8 CAPSULE ORAL DAILY
Qty: 180 CAPSULE | Refills: 2 | Status: SHIPPED | OUTPATIENT
Start: 2025-03-31 | End: 2025-12-26

## 2025-03-31 ASSESSMENT — PATIENT HEALTH QUESTIONNAIRE - PHQ9
1. LITTLE INTEREST OR PLEASURE IN DOING THINGS: NOT AT ALL
2. FEELING DOWN, DEPRESSED OR HOPELESS: NOT AT ALL
SUM OF ALL RESPONSES TO PHQ9 QUESTIONS 1 AND 2: 0

## 2025-03-31 NOTE — PROGRESS NOTES
PROBLEM LIST:  1. Elevated PSA        2. Hyperplasia of prostate with lower urinary tract symptoms (LUTS)  tamsulosin (Flomax) 0.4 mg 24 hr capsule      3. Malignant neoplasm of prostate (Multi)  NM PET CT prostate PSMA    Referral to Radiation Oncology    tamsulosin (Flomax) 0.4 mg 24 hr capsule          HISTORY OF PRESENT ILLNESS:   Juan Antonio Araujo is a  68 year old male  with history of elevated PSA. Denies any family history of cancer. Denies any family history of prostate cancer. MR prostate completed 8/15/2024.     Denies hematuria, dysuria, nocturia, flank pain, and bothersome frequency or urgency. Denies pushing or straining to void and states he feels he empties his bladder when voiding. Post-void residual 8/15/24 208 cc. He currently utilizes Flomax 0.4mg.     Interim history:  3/31/25: he presents for follow up today. He is s/p transperineal prostate biopsy on 3/14/25. He reports doing overall well with no acute concerns. Denies any pain. Reports 2 episodes of blood in urine, now resolved. He reports having weak stream and going up to 3-4 times to urinate at night, compared to 2 times prior to the biopsy. He denies any fevers, chills, nausea, vomiting.    PAST MEDICAL HISTORY:  PSA Trend:  4/1/2024:6.28  9/22/2023: 4.01  3/16/2023: 4.01  11/6/2020: 2.48    Past Medical History:   Diagnosis Date    CKD (chronic kidney disease)     10/21/24 BUN 22, Creatinine 1.48, GFR 51    Essential (primary) hypertension 09/24/2013    Benign essential hypertension    Hyperlipidemia     10/21/24 chol 140, LDL 72, Triglycerides 190    Lower abdominal pain, unspecified     Groin pain    Lumbar radiculopathy     Obesity     BMI 37.61    Personal history of other endocrine, nutritional and metabolic disease     History of hyperlipidemia    Sciatica     Spinal stenosis     Type 2 diabetes mellitus     1/27/25 A1c 8.9. blood sugar at home typically 140's       PAST SURGICAL HISTORY:  Past Surgical History:   Procedure  Laterality Date    COLONOSCOPY  2016    Complete Colonoscopy    COLONOSCOPY  2021    rpt 1-26    ROTATOR CUFF REPAIR Bilateral     Rotator Cuff Repair        ALLERGIES:   No Known Allergies     MEDICATIONS:     Current Outpatient Medications:     aspirin 81 mg EC tablet, Take 1 tablet (81 mg) by mouth once daily., Disp: , Rfl:     atorvastatin (Lipitor) 80 mg tablet, Take 1 tablet (80 mg) by mouth once daily., Disp: 90 tablet, Rfl: 3    empagliflozin (Jardiance) 25 mg, Take 1 tablet (25 mg) by mouth once daily., Disp: 90 tablet, Rfl: 3    glimepiride (Amaryl) 4 mg tablet, Take 1 tablet (4 mg) by mouth 2 times a day., Disp: 180 tablet, Rfl: 3    lancets (OneTouch UltraSoft Lancets) misc, Test twice daily. (Patient not taking: Reported on 2025), Disp: , Rfl:     lancets 33 gauge misc, Use to test blood sugars twice daily (Patient not taking: Reported on 2025), Disp: 100 each, Rfl: 2    lisinopriL-hydrochlorothiazide 20-25 mg tablet, TAKE 1 TABLET BY MOUTH EVERY DAY, Disp: 90 tablet, Rfl: 3    OneTouch Ultra Test strip, Ultra Blue Strips: TEST BLOOD SUGAR TWICE A DAY (Patient not taking: Reported on 2025), Disp: , Rfl:     tamsulosin (Flomax) 0.4 mg 24 hr capsule, Take 1 capsule (0.4 mg) by mouth once daily., Disp: 90 capsule, Rfl: 2      SOCIAL HISTORY:  Patient  reports that he has never smoked. He has never used smokeless tobacco. He reports current alcohol use of about 2.0 standard drinks of alcohol per week. He reports that he does not use drugs.   Social History     Socioeconomic History    Marital status:      Spouse name: Not on file    Number of children: 3    Years of education: Not on file    Highest education level: Not on file   Occupational History    Occupation: ,    Tobacco Use    Smoking status: Never    Smokeless tobacco: Never    Tobacco comments:     Cigar on occasion- few times a year    Vaping Use    Vaping status: Never Used   Substance and  Sexual Activity    Alcohol use: Yes     Alcohol/week: 2.0 standard drinks of alcohol     Types: 2 Shots of liquor per week     Comment: around once a month    Drug use: Never    Sexual activity: Not on file   Other Topics Concern    Not on file   Social History Narrative    Not on file     Social Drivers of Health     Financial Resource Strain: Not on file   Food Insecurity: Not on file   Transportation Needs: Not on file   Physical Activity: Inactive (9/22/2023)    Exercise Vital Sign     Days of Exercise per Week: 0 days     Minutes of Exercise per Session: 0 min   Stress: Not on file   Social Connections: Not on file   Intimate Partner Violence: Not on file   Housing Stability: Not on file       FAMILY HISTORY:  Family History   Problem Relation Name Age of Onset    Diabetes Mother      Pneumonia Father      Diabetes Brother       REVIEW OF SYSTEMS:   Constitutional: Negative for fever and chills. Denies anorexia, weight loss.  Eyes: Negative for visual disturbance.   Respiratory: Negative for shortness of breath.    Cardiovascular: Negative for chest pain.   Gastrointestinal: Negative for nausea and vomiting.   Genitourinary: See interval history above.  Skin: Negative for rash.   Neurological: Negative for dizziness and numbness.   Psychiatric/Behavioral: Negative for confusion and decreased concentration.     PHYSICAL EXAM:  There were no vitals taken for this visit.  Constitutional: Patient appears well-developed and well-nourished. No distress.    Head: Normocephalic and atraumatic.    Neck: Normal range of motion.    Cardiovascular: Normal rate.    Pulmonary/Chest: Effort normal. No respiratory distress.   Abdominal: soft NTND  Musculoskeletal: Normal range of motion.    Neurological: Alert and oriented to person, place, and time.  Psychiatric: Normal mood and affect. Behavior is normal. Thought content normal.      LABORATORY REVIEW:     Lab Results   Component Value Date    BUN 19 02/28/2025    CREATININE  1.57 (H) 02/28/2025    EGFR 47 (L) 02/28/2025     02/28/2025    K 4.1 02/28/2025     02/28/2025    CO2 32 02/28/2025    CALCIUM 9.1 02/28/2025      Lab Results   Component Value Date    WBC 6.6 02/28/2025    RBC 5.51 02/28/2025    HGB 15.8 02/28/2025    HCT 48.6 02/28/2025    MCV 88 02/28/2025    MCH 28.7 02/28/2025    MCHC 32.5 02/28/2025    RDW 13.7 02/28/2025     (L) 02/28/2025        Lab Results   Component Value Date    PSA 5.33 (H) 10/21/2024    PSA 6.28 (H) 04/01/2024    PSA 4.01 (H) 09/22/2023    PSA 4.01 (H) 03/16/2023    PSA 2.48 11/06/2020      PATHOLOGICAL REVIEW  FINAL DIAGNOSIS   A. PROSTATE NEEDLE BIOPSY LEFT PARAMEDIAN APEX:   Prostatic adenocarcinoma, Manjula score 6 (3+3), grade group 1, involving 1 of 1 cores, approximately 5% of submitted tissue     Note: No intraductal carcinoma identified.  Immunostain cocktail PIN4 is consistent with the above diagnosis.     B. PROSTATE NEEDLE BIOPSY RIGHT ANTERIOR:   Benign prostatic tissue     C. PROSTATE NEEDLE BIOPSY RIGHT LATERAL:   Benign fibrous tissue     D. PROSTATE NEEDLE BIOPSY RIGHT POSTERIOR BASE:   Benign prostatic tissue     E. PROSTATE NEEDLE BIOPSY RIGHT POSTERIOR APEX:   Prostatic adenocarcinoma, Burlington score 7 (3+4), grade group 2, involving 1 of 1 cores, approximately 20% of submitted tissue     - Percentage of Manjula pattern 4: 5%     Note: No intraductal carcinoma or cribriform pattern identified.  Immunostain cocktail PIN4 is consistent with the above diagnosis.     F. PROSTATE NEEDLE BIOPSY RIGHT PARAMEDIAN BASE:   Prostatic adenocarcinoma, Manjula score 7 (3+4), grade group 2, involving 1 of 1 cores, approximately 40% of submitted tissue     - Percentage of Burlington pattern 4: 5%     Note: No intraductal carcinoma or cribriform pattern identified.     G. PROSTATE NEEDLE BIOPSY RIGHT PARAMEDIAN APEX:   Prostatic adenocarcinoma, Burlington score 7 (3+4), grade group 2, involving 1 of 1 cores, approximately 20% of  submitted tissue     - Percentage of Fisher pattern 4: 5%  Atypical intraductal proliferation     Note: No cribriform pattern identified.  Immunostain cocktail PIN4 is consistent with the above diagnosis.     H. PROSTATE NEEDLE BIOPSY LEFT ANTERIOR:   Benign prostatic tissue     I. PROSTATE NEEDLE BIOPSY LEFT LATERAL:   Benign fibromuscular tissue     J. PROSTATE NEEDLE BIOPSY LEFT PARAMEDIAN BASE:   Prostatic adenocarcinoma, Fisher score 6 (3+3), grade group 1, involving 1 of 1 cores, approximately 10% of submitted tissue  High-grade prostatic intraepithelial neoplasia present     Note: No intraductal carcinoma identified.     K. PROSTATE NEEDLE BIOPSY LEFT POSTERIOR APEX:   Prostatic adenocarcinoma, Fisher score 6 (3+3), grade group 1, involving 1 of 3 cores, approximately 10% of submitted tissue     Note: No intraductal carcinoma identified.  Immunostain cocktail PIN4 is consistent with the above diagnosis.     L. PROSTATE NEEDLE BIOPSY LEFT POSTERIOR BASE:   Prostatic adenocarcinoma, Fisher score 6 (3+3), grade group 1, involving 1 of 1 cores, approximately 5% of submitted tissue     Note: No intraductal carcinoma identified.  Immunostain cocktail PIN4 is consistent with the above diagnosis.     M. PROSTATE BIOPSY TARGETED ONEIDA #1:   Prostatic adenocarcinoma, Manjula score 7 (4+3), grade group 3, involving 3 of 5 cores, approximately  30% of submitted tissue     Note: No intraductal carcinoma or cribriform pattern identified.  Immunostain cocktail PIN4 is consistent with the above diagnosis.       Electronically signed by Jelly Gautam DO on 3/26/2025 at 1036        Assessment:     1. Elevated PSA        2. Hyperplasia of prostate with lower urinary tract symptoms (LUTS)  tamsulosin (Flomax) 0.4 mg 24 hr capsule      3. Malignant neoplasm of prostate (Multi)  NM PET CT prostate PSMA    Referral to Radiation Oncology    tamsulosin (Flomax) 0.4 mg 24 hr capsule        Plan:   We dicussed his presentation in  detail.    Increase the dose of Flomax to 0.8 mg daily to improve urinary flow.  Reviewed and interpreted pathology results with patient that revealed GG3 prostate cancer  Discussed the different management options with patient which include but are not limited to active surveillance, prostatectomy, and radiation  We discussed the risks and benefits of each management option  PSMA scan ordered  Referral placed to radiation Oncology to explore radiation therapy as treatment option  RTC in 1 month    36 minutes total spent on patient's care today; >50% time spent on counseling/coordination of care      Scribe Attestation  By signing my name below, Zoila MCKINNON Scribe   attest that this documentation has been prepared under the direction and in the presence of Dario Barraza MD.

## 2025-04-03 ENCOUNTER — TELEPHONE (OUTPATIENT)
Dept: RADIATION ONCOLOGY | Facility: HOSPITAL | Age: 70
End: 2025-04-03
Payer: MEDICARE

## 2025-04-03 NOTE — TELEPHONE ENCOUNTER
Called pt. to remind of appointment on 4/8/2025 at 12:30 pm with Dr Mc. Pt's phone went to voicemail left number if needs to reschedule.

## 2025-04-04 ENCOUNTER — HOSPITAL ENCOUNTER (OUTPATIENT)
Dept: RADIOLOGY | Facility: HOSPITAL | Age: 70
Discharge: HOME | End: 2025-04-04
Payer: MEDICARE

## 2025-04-04 DIAGNOSIS — C61 MALIGNANT NEOPLASM OF PROSTATE (MULTI): ICD-10-CM

## 2025-04-04 PROCEDURE — 78815 PET IMAGE W/CT SKULL-THIGH: CPT | Mod: PI

## 2025-04-04 PROCEDURE — 3430000001 HC RX 343 DIAGNOSTIC RADIOPHARMACEUTICALS: Mod: TB | Performed by: STUDENT IN AN ORGANIZED HEALTH CARE EDUCATION/TRAINING PROGRAM

## 2025-04-04 PROCEDURE — A9596 HC RX 343 DIAGNOSTIC RADIOPHARMACEUTICALS: HCPCS | Mod: TB | Performed by: STUDENT IN AN ORGANIZED HEALTH CARE EDUCATION/TRAINING PROGRAM

## 2025-04-04 RX ADMIN — KIT FOR THE PREPARATION OF GALLIUM GA 68 GOZETOTIDE INJECTION 5.5 MILLICURIE: KIT INTRAVENOUS at 13:22

## 2025-04-08 ENCOUNTER — HOSPITAL ENCOUNTER (OUTPATIENT)
Dept: RADIATION ONCOLOGY | Facility: HOSPITAL | Age: 70
Setting detail: RADIATION/ONCOLOGY SERIES
Discharge: HOME | End: 2025-04-08
Payer: MEDICARE

## 2025-04-08 ENCOUNTER — HOSPITAL ENCOUNTER (OUTPATIENT)
Dept: RADIOLOGY | Facility: HOSPITAL | Age: 70
Discharge: HOME | End: 2025-04-08
Payer: MEDICARE

## 2025-04-08 VITALS
HEIGHT: 65 IN | BODY MASS INDEX: 38.15 KG/M2 | SYSTOLIC BLOOD PRESSURE: 124 MMHG | WEIGHT: 229 LBS | HEART RATE: 92 BPM | RESPIRATION RATE: 18 BRPM | OXYGEN SATURATION: 95 % | TEMPERATURE: 98.1 F | DIASTOLIC BLOOD PRESSURE: 82 MMHG

## 2025-04-08 DIAGNOSIS — C61 MALIGNANT NEOPLASM OF PROSTATE (MULTI): ICD-10-CM

## 2025-04-08 PROCEDURE — 99205 OFFICE O/P NEW HI 60 MIN: CPT | Performed by: STUDENT IN AN ORGANIZED HEALTH CARE EDUCATION/TRAINING PROGRAM

## 2025-04-08 PROCEDURE — 99215 OFFICE O/P EST HI 40 MIN: CPT | Performed by: STUDENT IN AN ORGANIZED HEALTH CARE EDUCATION/TRAINING PROGRAM

## 2025-04-08 PROCEDURE — 72170 X-RAY EXAM OF PELVIS: CPT | Performed by: RADIOLOGY

## 2025-04-08 PROCEDURE — G2211 COMPLEX E/M VISIT ADD ON: HCPCS | Performed by: STUDENT IN AN ORGANIZED HEALTH CARE EDUCATION/TRAINING PROGRAM

## 2025-04-08 PROCEDURE — 72170 X-RAY EXAM OF PELVIS: CPT

## 2025-04-08 ASSESSMENT — ENCOUNTER SYMPTOMS
EYES NEGATIVE: 1
HEMATOLOGIC/LYMPHATIC NEGATIVE: 1
RESPIRATORY NEGATIVE: 1
GASTROINTESTINAL NEGATIVE: 1
CARDIOVASCULAR NEGATIVE: 1
BACK PAIN: 1
NEUROLOGICAL NEGATIVE: 1
ENDOCRINE NEGATIVE: 1
PSYCHIATRIC NEGATIVE: 1
OCCASIONAL FEELINGS OF UNSTEADINESS: 0
CONSTITUTIONAL NEGATIVE: 1
ARTHRALGIAS: 1
LOSS OF SENSATION IN FEET: 0
DEPRESSION: 0

## 2025-04-08 ASSESSMENT — PAIN SCALES - GENERAL: PAINLEVEL_OUTOF10: 0-NO PAIN

## 2025-04-08 NOTE — PROGRESS NOTES
Radiation Oncology Nursing Note    IPSS (International Prostate Symptom Score):   4 / 35, bother 2  In the past month:   Incomplete Emptying - How often have you had the sensation of not emptying you bladder?   0 (not at all)  Frequency - How often have you had to urinate less than every 2 hours?   0 (not at all)  Intermittency - How often have you found you stopped and started again several times when you urinated?   0 (not at all)  Urgency - How often have you found it difficult to postpone urination?   2 (less than half the time)  Weak stream - How often have you had a weak urinary stream?   2 (less than half the time)  Straining - How often have you had to strain to start urination?   0 (not at all)  Nocturia - How many times did you typically get up at night to urinate?   2 (2 times)  Quality of Life due to Urinary Symptoms  If you were to spend the rest of your life with your urinary condition just the way it is now, how would you feel about that?   2 (Mostly Satisfied)     - He is experiencing urinary incontinence. Some dribbling post urination - does not wear pads.  Flomax    - He is not experiencing dysuria, hematuria, flank pain.     Sexual function:   - Quality of erections during the last 4 weeks: 1 = None at all  - Use of erectile dysfunction medications:  None    Bowel function:    - Denies hematochezia or pain.    - He does not have a history of hemorrhoids.    - He does not have a history of inflammatory bowel disease (Crohn's, Ulcerative Colitis).    Prior Radiotherapy:  No  Radiation Treatments       No radiation treatments to show. (Treatments may have been administered in another system.)            Current Systemic Treatment:  No     Presence of Pacemaker or ICD:  No    History of Autoimmune or Connective Tissue Disorders:  No    Pain: The patient's current pain level was assessed.  They report currently having a pain of 0 out of 10.  They feel their pain is under control without the use of pain  medications.    Review of Systems:  Review of Systems   Constitutional: Negative.    HENT:  Negative.     Eyes: Negative.    Respiratory: Negative.     Cardiovascular: Negative.    Gastrointestinal: Negative.    Endocrine: Negative.    Genitourinary:  Positive for nocturia (x2).         Occasional urgency   Musculoskeletal:  Positive for arthralgias and back pain (lower back).   Skin: Negative.    Neurological: Negative.    Hematological: Negative.    Psychiatric/Behavioral: Negative.

## 2025-04-08 NOTE — PROGRESS NOTES
Staff Physician: Darby Mc MD PhD  Referring Physician: Dario Barraza MD  Date of Service: 4/8/2025  Patient name: Juan Antonio Araujo   MRN: [id number]    RADIATION ONCOLOGY CONSULT NOTE    IDENTIFYING DATA:  DIAGNOSIS: newly diagnosed   Cancer Staging   Malignant neoplasm of prostate (Multi)  Staging form: Prostate, AJCC 8th Edition  - Clinical stage from 3/14/2025: cT1c, cN0, PSA: 5.3, Grade Group: 3 - Signed by Darby Mc MD PhD on 4/8/2025    DISEASE STATE: No prior treatment  DISEASE STATUS: Treatment pending work-up  Problem List Items Addressed This Visit       Malignant neoplasm of prostate (Multi)    Relevant Orders    Referral to Radiation Oncology    XR pelvis 1-2 views (Completed)       Mr. Juan Antonio Araujo is a 69-year-old with newly diagnosed prostate adenocarcinoma, referred by Dr. Dario Barraza, for evaluation and discussion of treatment recommendations.    HISTORY OF PRESENT ILLNESS:  10/21/2024 PSA 5.33    8/28/2024 MRI prostate noted 95.7 cc gland, PI-RADS 4 lesion in the right PZ at apex (SABINE, no SV invasion.  There was patchy heterogeneous T1 hypointense and T2 hyperintense signal in bilateral proximal femurs, right superior pubic ramus and right acetabulum Paget's disease    3/14/2025 targeted and systematic biopsy noted GG3, 7/12 cores, 1/1 targeted core, and no intraductal carcinoma or cribriform pattern.    4/4/2025 PSMA PET/CT noted focal uptake within the right PZ, SUV max 4.6, otherwise no evidence of giuseppe disease.  Increased avidity in the right pubic symphysis and pubic ramus, SUV max 7.0, suspicious for metastatic disease.    4/8/2025 x-ray pelvis noted subtle coarsening of trabeculae of the right superior inferior pubic rami may represent Paget's disease.    Today, Mr. Juan Antonio Araujo is here by himself.  Symptomatically, he reports:    1.  Full independence in activities of daily living. Works full time as a [employer].  2.  Urinary function is normal. IPSS 4, with post-void  dribbling.   3.  Bowel function is normal. Last colonoscopy 01/2021  4.  Normal appetite. No unintentional weight gain or loss.   5.  Pain score: 0/10 -- occasional arthritis in bilateral shoulders and knees.  6.  Has CKD. Denies personal history of MI or stroke. History of diabetes, HgbA1c is 8.4%.     PAST MEDICAL HISTORY:  Past Medical History:   Diagnosis Date    CKD (chronic kidney disease)     10/21/24 BUN 22, Creatinine 1.48, GFR 51    Essential (primary) hypertension 09/24/2013    Benign essential hypertension    Hyperlipidemia     10/21/24 chol 140, LDL 72, Triglycerides 190    Lower abdominal pain, unspecified     Groin pain    Lumbar radiculopathy     Obesity     BMI 37.61    Personal history of other endocrine, nutritional and metabolic disease     History of hyperlipidemia    Prostate cancer (Multi)     Sciatica     Spinal stenosis     Type 2 diabetes mellitus     1/27/25 A1c 8.9. blood sugar at home typically 140's     PAST SURGICAL HISTORY:  Past Surgical History:   Procedure Laterality Date    COLONOSCOPY  05/06/2016    Complete Colonoscopy    COLONOSCOPY  01/2021    rpt 1-26    ROTATOR CUFF REPAIR Bilateral     Rotator Cuff Repair     ALLERGIES:  No Known Allergies  MEDICATIONS:    Current Outpatient Medications:     aspirin 81 mg EC tablet, Take 1 tablet (81 mg) by mouth once daily., Disp: , Rfl:     atorvastatin (Lipitor) 80 mg tablet, Take 1 tablet (80 mg) by mouth once daily., Disp: 90 tablet, Rfl: 3    empagliflozin (Jardiance) 25 mg, Take 1 tablet (25 mg) by mouth once daily., Disp: 90 tablet, Rfl: 3    glimepiride (Amaryl) 4 mg tablet, Take 1 tablet (4 mg) by mouth 2 times a day., Disp: 180 tablet, Rfl: 3    lisinopriL-hydrochlorothiazide 20-25 mg tablet, TAKE 1 TABLET BY MOUTH EVERY DAY, Disp: 90 tablet, Rfl: 3    tamsulosin (Flomax) 0.4 mg 24 hr capsule, Take 2 capsules (0.8 mg) by mouth once daily., Disp: 180 capsule, Rfl: 2    lancets (OneTouch UltraSoft Lancets) misc, Test twice daily.  "(Patient not taking: Reported on 2025), Disp: , Rfl:     lancets 33 gauge misc, Use to test blood sugars twice daily (Patient not taking: Reported on 2025), Disp: 100 each, Rfl: 2    OneTouch Ultra Test strip, Ultra Blue Strips: TEST BLOOD SUGAR TWICE A DAY (Patient not taking: Reported on 2025), Disp: , Rfl:    SOCIAL HISTORY:  Social History     Tobacco Use    Smoking status: Some Days     Types: Cigarettes, Cigars    Smokeless tobacco: Never    Tobacco comments:     Cigar on occasion- few times a year    Substance Use Topics    Alcohol use: Not Currently     Alcohol/week: 2.0 standard drinks of alcohol     Types: 2 Shots of liquor per week     Comment: around once a month     FAMILY HISTORY:  Family History   Problem Relation Name Age of Onset    Diabetes Mother      Pneumonia Father      Diabetes Brother         REVIEW OF SYSTEMS:  Please refer to RN note.    PHYSICAL EXAMINATION:  /82   Pulse 92   Temp 36.7 °C (98.1 °F) (Temporal)   Resp 18   Ht 1.651 m (5' 5\")   Wt 104 kg (229 lb)   SpO2 95%   BMI 38.11 kg/m²   Constitutional: well developed, no distress, alert & oriented, cooperative  Eyes: pupils equal round and reactive to light, extraocular movements intact  Respiratory: normal work of breathing  Psychological: normal affect    CTCAE (v5) ADVERSE EVENTS:  Toxicity Assessment          2025    13:00   Toxicity Assessment   Treatment Site Prostate RT   Diarrhea Grade 0   Urinary Incontinence Grade 0   Erectile Dysfunction Grade 2   Urinary Frequency Grade 1       nocturia x2   Urinary Retention Grade 2       flomax 0.4mg   Urinary Urgency Grade 1       PERFORMANCE STATUS:  KPS/ECO, Fully active, able to carry on all pre-disease performed without restriction (ECOG equivalent 0)    LABORATORY AND IMAGING DATA:  Imaging: All imaging was personally reviewed and interpreted in clinic. Findings as per HPI and EMR.    Laboratory/Pathology:  All pertinent labs and pathology were " personally reviewed and interpreted in clinic. Findings as per HPI and EMR.  Lab Results   Component Value Date    PSA 5.33 (H) 10/21/2024    PSA 6.28 (H) 04/01/2024    PSA 4.01 (H) 09/22/2023    PSA 4.01 (H) 03/16/2023    PSA 2.48 11/06/2020    PSASCREEN 3.84 06/19/2023    PSASCREEN 3.72 01/06/2022    PSASCREEN 2.59 09/04/2019    PSASCREEN 2.37 05/18/2018     Lab Results   Component Value Date    TESTOTOTMS 311 05/18/2018         Lab Results   Component Value Date    BUN 19 02/28/2025    CREATININE 1.57 (H) 02/28/2025    EGFR 47 (L) 02/28/2025     02/28/2025    K 4.1 02/28/2025     02/28/2025    CO2 32 02/28/2025    CALCIUM 9.1 02/28/2025    HGBA1C 8.4 (H) 02/28/2025        IMPRESSION:  69 year-old gentleman with newly diagnosed prostate adenocarcinoma, iPSA 5.3, GG3 (7/12 cores+, 1/1 targeted core+, and no intraductal carcinoma or cribriform pattern), cN0 by PSMA PET/CT, with increased avidity in right pubic ramus (SUVmax 7.0) suspicious for metastases versus Paget's disease     If he does not have bone metastasis, I discussed that treatment options of intermediate unfavorable risk prostate cancer include surgery or definitive external beam radiation therapy (EBRT) with a short course of hormone suppression.  I discussed various EBRT treatment regimens, including moderate hypofractionation over 20 treatments, and ultra hypofractionation over 5 treatments. His IPSS is 4 (some post void dribbling), as such, ultra hypofractionation is reasonable.    I discussed the logistics of radiation, including placement of fiducials into the prostate for improved and spacer gel to reduce radiation dose to the rectum.  Logistics of radiation therapy including CT-based simulation and bowel and bladder preparation were discussed, as well as risks and benefits of radiation therapy including possible short- and long-term side effects such as fatigue, toxicities to the bladder, urethra, and rectum, sexual dysfunction,  infertility and secondary malignancy in the radiation field.     Potential side effects from hormonal suppression therapy were briefly discussed, which include but are not limited to sexual dysfunction, increased risk of osteoporosis and bone fractures, hot flashes' weight gain and worsening of his pre-existing diabetes, fatigue, arthralgias.      If he has metastatic disease, I discussed the role of external beam radiation therapy of the prostate is to reduce local symptoms in the long run, improve local control, and delay development of castrate resistant disease.       Mr. Araujo asked a number of excellent questions that demonstrated good understanding, we will discuss next steps after GUTB review of his bone.    PLAN:  -present at [facility] to review bone, may need bone biopsy to confirm   -referral to medical oncology, he prefers to review medical oncologists and will let us know who to refer to    Thank you for the opportunity to participate in the care of this kind patient.    Darby Mc MD PhD  , Radiation Oncology

## 2025-04-15 ENCOUNTER — APPOINTMENT (OUTPATIENT)
Dept: HEMATOLOGY/ONCOLOGY | Facility: HOSPITAL | Age: 70
End: 2025-04-15
Payer: MEDICARE

## 2025-04-15 NOTE — TUMOR BOARD NOTE
MULTIDISCIPLINARY GENITOURINARY TUMOR BOARD CONFERENCE NOTE  Juan Antonio Araujo was presented at Genitourinary Tumor Board Conference  Conference date: 4/15/2025  Presenting Provider(s): Dr. Darby Mc  Present at Conference: Medical Oncology, Radiation Oncology, Surgical Oncology, Radiology, Nuclear Medicine, and Pathology Representatives  Conference Review Type: Radiology Review and Treatment Plan Review     Impression:  Juan Antonio Araujo is a 69 y.o. male presented by Dr. Darby Mc for GG3 ([place] score 4+3=7) prostate cancer, 3/5 cores (30% of tissue), GG2, GG1 (3/14/25). Hx of HTN, CKD stage 3, DM2.     PSA: 5.33 (10/21/24)    MRI Prostate (8/28/24): 95.71 ml, a PI-RADS 4 lesion in the right posteromedial PZ at the prostatic apex, no gross extracapsular extension, no enlarged pelvic lymph nodes, patchy heterogenous T1 hypointense and T2 hyperintense signal abnormality involving the visualized pelvic osseous structures, particularly throughout the right iliac bone and bilateral proximal femurs, which may represent Paget's disease.     PSMA/PET (4/4/25): heterogenous Ga68 PSMA expression within the enlarged prostate gland with more focal uptake in the right posteromedial PZ, corresponding to PI-RADS 4 lesion seen on recent MRI, consistent with biopsy-proven adenocarcinoma, no abnormal PSMA expression within the seminal vesicle to suggest extraprostatic involvement, no evidence for Ga68 PSMA-avid pelvic lymph node metastasis, focal PSMA expression within right symphysis pubis and pubic ramus, concerning for osseous metastatic disease, abnormal heterogenous Ga68 uptake within right ischium, right acetabulum and right iliac bone is nonspecific, differential includes Paget's disease versus underlying osseous metastatic involvement.    National Guidelines discussed: Yes    Recommendations:  Imaging: N/A  Surgical Resection: N/A  Systemic therapy: N/A  Radiation therapy: N/A    Treatment Plan Recommendations:  Tumor  Board reviewed imaging, confirmed multiple focal uptake in prostate bed. Confirmed right-side avid pelvic bone lesion, high suspicion of disease invasion into cortex of bone and bone marrow. Differential includes Paget's disease versus metastatic disease. Recommend bone biopsy.    Disclaimer  [facility] tumor board recommendations represent the consensus opinion of physicians present at a weekly patient care conference. The treating [facility] physician is not always present, and many of the physicians formulating the recommendation have not personally seen or examined the patient under discussion. It is understood that the treating [facility] physician considers the expertise of the Tumor Board Recommendation in formulating his/her plan for the patient. However, in many situations, based on individualized patient considerations, a different plan is determined by the treating physician to be the optimal medical management.         Scribe Attestation  By signing my name below, IKaye Scribe   attest that this documentation has been prepared under the direction and in the presence of GENITOURINARY TUMOR BOARD.

## 2025-04-17 LAB — SCAN RESULT: NORMAL

## 2025-04-29 ENCOUNTER — APPOINTMENT (OUTPATIENT)
Dept: PRIMARY CARE | Facility: CLINIC | Age: 70
End: 2025-04-29
Payer: MEDICARE

## 2025-04-29 VITALS
DIASTOLIC BLOOD PRESSURE: 76 MMHG | SYSTOLIC BLOOD PRESSURE: 134 MMHG | WEIGHT: 225 LBS | HEART RATE: 80 BPM | BODY MASS INDEX: 37.44 KG/M2

## 2025-04-29 DIAGNOSIS — E11.9 DIABETES MELLITUS TYPE 2 WITHOUT RETINOPATHY (MULTI): Primary | ICD-10-CM

## 2025-04-29 DIAGNOSIS — M48.061 SPINAL STENOSIS OF LUMBAR REGION WITHOUT NEUROGENIC CLAUDICATION: ICD-10-CM

## 2025-04-29 DIAGNOSIS — C61 MALIGNANT NEOPLASM OF PROSTATE (MULTI): ICD-10-CM

## 2025-04-29 DIAGNOSIS — E78.49 FAMILIAL COMBINED HYPERLIPIDEMIA: ICD-10-CM

## 2025-04-29 DIAGNOSIS — I10 BENIGN ESSENTIAL HYPERTENSION: ICD-10-CM

## 2025-04-29 LAB — POC FINGERSTICK BLOOD GLUCOSE: 176 MG/DL (ref 70–100)

## 2025-04-29 PROCEDURE — 82962 GLUCOSE BLOOD TEST: CPT | Performed by: INTERNAL MEDICINE

## 2025-04-29 PROCEDURE — 3052F HG A1C>EQUAL 8.0%<EQUAL 9.0%: CPT | Performed by: INTERNAL MEDICINE

## 2025-04-29 PROCEDURE — 3075F SYST BP GE 130 - 139MM HG: CPT | Performed by: INTERNAL MEDICINE

## 2025-04-29 PROCEDURE — 3078F DIAST BP <80 MM HG: CPT | Performed by: INTERNAL MEDICINE

## 2025-04-29 PROCEDURE — 1159F MED LIST DOCD IN RCRD: CPT | Performed by: INTERNAL MEDICINE

## 2025-04-29 PROCEDURE — 1160F RVW MEDS BY RX/DR IN RCRD: CPT | Performed by: INTERNAL MEDICINE

## 2025-04-29 PROCEDURE — G2211 COMPLEX E/M VISIT ADD ON: HCPCS | Performed by: INTERNAL MEDICINE

## 2025-04-29 PROCEDURE — 99213 OFFICE O/P EST LOW 20 MIN: CPT | Performed by: INTERNAL MEDICINE

## 2025-04-29 ASSESSMENT — ENCOUNTER SYMPTOMS
SEIZURES: 0
DIZZINESS: 0
SINUS PRESSURE: 0
ADENOPATHY: 0
WHEEZING: 0
PALPITATIONS: 0
CONSTIPATION: 0
LIGHT-HEADEDNESS: 0
VOICE CHANGE: 0
ABDOMINAL DISTENTION: 0
HEADACHES: 0
ACTIVITY CHANGE: 0
ABDOMINAL PAIN: 0
FACIAL SWELLING: 0
EYE ITCHING: 0
CHEST TIGHTNESS: 0
DYSURIA: 0
BRUISES/BLEEDS EASILY: 0
BLOOD IN STOOL: 0
DIARRHEA: 0
FATIGUE: 0
FREQUENCY: 0
TREMORS: 0
CHILLS: 0
WOUND: 0
NECK PAIN: 0
EYE DISCHARGE: 0
NECK STIFFNESS: 0
CHOKING: 0
EYE REDNESS: 0
BACK PAIN: 0
FLANK PAIN: 0
PHOTOPHOBIA: 0
SHORTNESS OF BREATH: 0
ARTHRALGIAS: 1
RECTAL PAIN: 0
SINUS PAIN: 0
DIAPHORESIS: 0
VOMITING: 0
COLOR CHANGE: 0
MYALGIAS: 0
RHINORRHEA: 0
SORE THROAT: 0
COUGH: 0
STRIDOR: 0
POLYDIPSIA: 0
HEMATURIA: 0
WEAKNESS: 0
TROUBLE SWALLOWING: 0
FACIAL ASYMMETRY: 0
SPEECH DIFFICULTY: 0
EYE PAIN: 0
DIFFICULTY URINATING: 0
JOINT SWELLING: 0
APPETITE CHANGE: 0
ANAL BLEEDING: 0
POLYPHAGIA: 0
NUMBNESS: 0
SLEEP DISTURBANCE: 0
NAUSEA: 0

## 2025-04-29 NOTE — PROGRESS NOTES
Subjective   Patient ID: Juan Antonio Araujo is a 69 y.o. male who presents for No chief complaint on file..    Patient presents for follow-up.  He has been compliant with his medications without diet or exercise.  He reports baseline low back pain.  He denies any headaches, no dizziness, no chest pain or shortness of breath.  He denies abdominal pain no nausea vomiting or diarrhea.  He reports no new musculoskeletal complaints.         Review of Systems   Constitutional:  Negative for activity change, appetite change, chills, diaphoresis and fatigue.   HENT:  Negative for congestion, dental problem, drooling, ear discharge, ear pain, facial swelling, hearing loss, mouth sores, nosebleeds, postnasal drip, rhinorrhea, sinus pressure, sinus pain, sneezing, sore throat, tinnitus, trouble swallowing and voice change.    Eyes:  Negative for photophobia, pain, discharge, redness, itching and visual disturbance.   Respiratory:  Negative for cough, choking, chest tightness, shortness of breath, wheezing and stridor.    Cardiovascular:  Negative for chest pain, palpitations and leg swelling.   Gastrointestinal:  Negative for abdominal distention, abdominal pain, anal bleeding, blood in stool, constipation, diarrhea, nausea, rectal pain and vomiting.   Endocrine: Negative for cold intolerance, heat intolerance, polydipsia, polyphagia and polyuria.   Genitourinary:  Negative for decreased urine volume, difficulty urinating, dysuria, enuresis, flank pain, frequency, genital sores, hematuria and urgency.   Musculoskeletal:  Positive for arthralgias. Negative for back pain, gait problem, joint swelling, myalgias, neck pain and neck stiffness.   Skin:  Negative for color change, pallor, rash and wound.   Neurological:  Negative for dizziness, tremors, seizures, syncope, facial asymmetry, speech difficulty, weakness, light-headedness, numbness and headaches.   Hematological:  Negative for adenopathy. Does not bruise/bleed easily.    Psychiatric/Behavioral:  Negative for sleep disturbance.        Objective   /76   Pulse 80   Wt 102 kg (225 lb)   BMI 37.44 kg/m²     Physical Exam  Constitutional:       Appearance: Normal appearance.   Cardiovascular:      Rate and Rhythm: Normal rate and regular rhythm.      Heart sounds: No murmur heard.     No gallop.   Pulmonary:      Effort: No respiratory distress.      Breath sounds: No wheezing or rales.   Abdominal:      General: There is no distension.      Palpations: There is no mass.      Tenderness: There is no abdominal tenderness. There is no guarding.   Musculoskeletal:      Right lower leg: No edema.      Left lower leg: No edema.   Neurological:      Mental Status: He is alert.         Assessment/Plan   Diagnoses and all orders for this visit:  Diabetes mellitus type 2 without retinopathy (Multi)-hemoglobin A1c was 8.5 in February.  He refuses any medication adjustments.  Risk of blindness, heart disease, kidney disease, amputation stroke explained.  Will schedule an ophthalmology appointment.  He will diet and exercise  -     POCT Fingerstick Glucose manually resulted  Benign essential hypertension-stable on present medication  Familial combined hyperlipidemia-we will continue with statin  Malignant neoplasm of prostate (Multi)-we had a lengthy discussion.  He will follow-up with oncology.  He is considering his options-stable symptoms.  Obesity-he will diet and exercise.  Spinal stenosis of lumbar region without neurogenic claudication  Health maintenance-colonoscopy later this year.  He will consider immunizations.  Schedule an eye and dental appointment.

## 2025-05-07 ENCOUNTER — TELEMEDICINE (OUTPATIENT)
Dept: UROLOGY | Facility: HOSPITAL | Age: 70
End: 2025-05-07
Payer: MEDICARE

## 2025-05-07 DIAGNOSIS — C61 MALIGNANT NEOPLASM OF PROSTATE (MULTI): Primary | ICD-10-CM

## 2025-05-07 PROCEDURE — 3052F HG A1C>EQUAL 8.0%<EQUAL 9.0%: CPT | Performed by: STUDENT IN AN ORGANIZED HEALTH CARE EDUCATION/TRAINING PROGRAM

## 2025-05-07 PROCEDURE — 99213 OFFICE O/P EST LOW 20 MIN: CPT | Performed by: STUDENT IN AN ORGANIZED HEALTH CARE EDUCATION/TRAINING PROGRAM

## 2025-05-07 PROCEDURE — G2211 COMPLEX E/M VISIT ADD ON: HCPCS | Performed by: STUDENT IN AN ORGANIZED HEALTH CARE EDUCATION/TRAINING PROGRAM

## 2025-05-07 NOTE — PROGRESS NOTES
Virtual or Telephone Consent    An interactive audio and video telecommunication system which permits real time communications between the patient (at the originating site) and provider (at the distant site) was utilized to provide this telehealth service.   Verbal consent was requested and obtained from Juan Antonio Araujo on this date, 05/07/25 for a telehealth visit and the patient's location was confirmed at the time of the visit.   UROLOGIC FOLLOW UP VISIT     PROBLEM LIST:  No diagnosis found.      HISTORY OF PRESENT ILLNESS:   Juan Antonio Araujo is a  68 year old male  with history of elevated PSA. Denies any family history of cancer. Denies any family history of prostate cancer.  prostate completed 8/15/2024.     Denies hematuria, dysuria, nocturia, flank pain, and bothersome frequency or urgency. Denies pushing or straining to void and states he feels he empties his bladder when voiding. Post-void residual 8/15/24 208 cc. He currently utilizes Flomax 0.4mg.     Interim history:  3/31/25: he presents for follow up today. He is s/p transperineal prostate biopsy on 3/14/25. He reports doing overall well with no acute concerns. Denies any pain. Reports 2 episodes of blood in urine, now resolved. He reports having weak stream and going up to 3-4 times to urinate at night, compared to 2 times prior to the biopsy. He denies any fevers, chills, nausea, vomiting.  5/7/25: This is a telehealth visit. Since the last visit, he reports thing have been about the same. He has seen DR. Mc on 4/8/25 for exploring treatment options, who recommended options including surgery or definitive external beam radiation therapy (EBRT) with a short course of hormone suppression. He would like to proceed with surgery. He denies any fevers, chills, nausea, vomiting.    PAST MEDICAL HISTORY:  PSA Trend:  10/21/24: 5.33  4/1/2024:6.28  9/22/2023: 4.01  3/16/2023: 4.01  11/6/2020: 2.48    Past Medical History:   Diagnosis Date    CKD  (chronic kidney disease)     10/21/24 BUN 22, Creatinine 1.48, GFR 51    Essential (primary) hypertension 09/24/2013    Benign essential hypertension    Hyperlipidemia     10/21/24 chol 140, LDL 72, Triglycerides 190    Lower abdominal pain, unspecified     Groin pain    Lumbar radiculopathy     Obesity     BMI 37.61    Personal history of other endocrine, nutritional and metabolic disease     History of hyperlipidemia    Prostate cancer (Multi)     Sciatica     Spinal stenosis     Type 2 diabetes mellitus     1/27/25 A1c 8.9. blood sugar at home typically 140's       PAST SURGICAL HISTORY:  Past Surgical History:   Procedure Laterality Date    COLONOSCOPY  05/06/2016    Complete Colonoscopy    COLONOSCOPY  01/2021    rpt 1-26    ROTATOR CUFF REPAIR Bilateral     Rotator Cuff Repair        ALLERGIES:   No Known Allergies     MEDICATIONS:     Current Outpatient Medications:     aspirin 81 mg EC tablet, Take 1 tablet (81 mg) by mouth once daily., Disp: , Rfl:     atorvastatin (Lipitor) 80 mg tablet, Take 1 tablet (80 mg) by mouth once daily., Disp: 90 tablet, Rfl: 3    empagliflozin (Jardiance) 25 mg, Take 1 tablet (25 mg) by mouth once daily., Disp: 90 tablet, Rfl: 3    glimepiride (Amaryl) 4 mg tablet, Take 1 tablet (4 mg) by mouth 2 times a day., Disp: 180 tablet, Rfl: 3    lisinopriL-hydrochlorothiazide 20-25 mg tablet, TAKE 1 TABLET BY MOUTH EVERY DAY, Disp: 90 tablet, Rfl: 3    tamsulosin (Flomax) 0.4 mg 24 hr capsule, Take 2 capsules (0.8 mg) by mouth once daily., Disp: 180 capsule, Rfl: 2      SOCIAL HISTORY:  Patient  reports that he has been smoking cigarettes and cigars. He has never used smokeless tobacco. He reports that he does not currently use alcohol after a past usage of about 2.0 standard drinks of alcohol per week. He reports that he does not use drugs.   Social History     Socioeconomic History    Marital status:      Spouse name: Not on file    Number of children: 3    Years of education:  Not on file    Highest education level: Not on file   Occupational History    Occupation: ,    Tobacco Use    Smoking status: Some Days     Types: Cigarettes, Cigars    Smokeless tobacco: Never    Tobacco comments:     Cigar on occasion- few times a year    Vaping Use    Vaping status: Never Used   Substance and Sexual Activity    Alcohol use: Not Currently     Alcohol/week: 2.0 standard drinks of alcohol     Types: 2 Shots of liquor per week     Comment: around once a month    Drug use: Never    Sexual activity: Not Currently     Partners: Female   Other Topics Concern    Not on file   Social History Narrative    Not on file     Social Drivers of Health     Financial Resource Strain: Not on file   Food Insecurity: Not on file   Transportation Needs: Not on file   Physical Activity: Inactive (2023)    Exercise Vital Sign     Days of Exercise per Week: 0 days     Minutes of Exercise per Session: 0 min   Stress: Not on file   Social Connections: Not on file   Intimate Partner Violence: Not on file   Housing Stability: Not on file       FAMILY HISTORY:  Family History   Problem Relation Name Age of Onset    Diabetes Mother      Pneumonia Father      Diabetes Brother       REVIEW OF SYSTEMS:   Constitutional: Negative for fever and chills. Denies anorexia, weight loss.  Eyes: Negative for visual disturbance.   Respiratory: Negative for shortness of breath.    Cardiovascular: Negative for chest pain.   Gastrointestinal: Negative for nausea and vomiting.   Genitourinary: See interval history above.  Skin: Negative for rash.   Neurological: Negative for dizziness and numbness.   Psychiatric/Behavioral: Negative for confusion and decreased concentration.     PHYSICAL EXAM:  There were no vitals taken for this visit.  Constitutional: Patient appears well-developed and well-nourished. No distress.    Head: Normocephalic and atraumatic.    Neck: Normal range of motion.    Cardiovascular: Normal rate.     Pulmonary/Chest: Effort normal. No respiratory distress.   Abdominal: soft NTND  Musculoskeletal: Normal range of motion.    Neurological: Alert and oriented to person, place, and time.  Psychiatric: Normal mood and affect. Behavior is normal. Thought content normal.      LABORATORY REVIEW:     Lab Results   Component Value Date    BUN 19 02/28/2025    CREATININE 1.57 (H) 02/28/2025    EGFR 47 (L) 02/28/2025     02/28/2025    K 4.1 02/28/2025     02/28/2025    CO2 32 02/28/2025    CALCIUM 9.1 02/28/2025      Lab Results   Component Value Date    WBC 6.6 02/28/2025    RBC 5.51 02/28/2025    HGB 15.8 02/28/2025    HCT 48.6 02/28/2025    MCV 88 02/28/2025    MCH 28.7 02/28/2025    MCHC 32.5 02/28/2025    RDW 13.7 02/28/2025     (L) 02/28/2025        Lab Results   Component Value Date    PSA 5.33 (H) 10/21/2024    PSA 6.28 (H) 04/01/2024    PSA 4.01 (H) 09/22/2023    PSA 4.01 (H) 03/16/2023    PSA 2.48 11/06/2020      PATHOLOGICAL REVIEW  FINAL DIAGNOSIS   A. PROSTATE NEEDLE BIOPSY LEFT PARAMEDIAN APEX:   Prostatic adenocarcinoma, [place] score 6 (3+3), grade group 1, involving 1 of 1 cores, approximately 5% of submitted tissue     Note: No intraductal carcinoma identified.  Immunostain cocktail PIN4 is consistent with the above diagnosis.     B. PROSTATE NEEDLE BIOPSY RIGHT ANTERIOR:   Benign prostatic tissue     C. PROSTATE NEEDLE BIOPSY RIGHT LATERAL:   Benign fibrous tissue     D. PROSTATE NEEDLE BIOPSY RIGHT POSTERIOR BASE:   Benign prostatic tissue     E. PROSTATE NEEDLE BIOPSY RIGHT POSTERIOR APEX:   Prostatic adenocarcinoma, [place] score 7 (3+4), grade group 2, involving 1 of 1 cores, approximately 20% of submitted tissue     - Percentage of [place] pattern 4: 5%     Note: No intraductal carcinoma or cribriform pattern identified.  Immunostain cocktail PIN4 is consistent with the above diagnosis.     F. PROSTATE NEEDLE BIOPSY RIGHT PARAMEDIAN BASE:   Prostatic adenocarcinoma, [place] score  7 (3+4), grade group 2, involving 1 of 1 cores, approximately 40% of submitted tissue     - Percentage of [place] pattern 4: 5%     Note: No intraductal carcinoma or cribriform pattern identified.     G. PROSTATE NEEDLE BIOPSY RIGHT PARAMEDIAN APEX:   Prostatic adenocarcinoma, [place] score 7 (3+4), grade group 2, involving 1 of 1 cores, approximately 20% of submitted tissue     - Percentage of [place] pattern 4: 5%  Atypical intraductal proliferation     Note: No cribriform pattern identified.  Immunostain cocktail PIN4 is consistent with the above diagnosis.     H. PROSTATE NEEDLE BIOPSY LEFT ANTERIOR:   Benign prostatic tissue     I. PROSTATE NEEDLE BIOPSY LEFT LATERAL:   Benign fibromuscular tissue     J. PROSTATE NEEDLE BIOPSY LEFT PARAMEDIAN BASE:   Prostatic adenocarcinoma, [place] score 6 (3+3), grade group 1, involving 1 of 1 cores, approximately 10% of submitted tissue  High-grade prostatic intraepithelial neoplasia present     Note: No intraductal carcinoma identified.     K. PROSTATE NEEDLE BIOPSY LEFT POSTERIOR APEX:   Prostatic adenocarcinoma, [place] score 6 (3+3), grade group 1, involving 1 of 3 cores, approximately 10% of submitted tissue     Note: No intraductal carcinoma identified.  Immunostain cocktail PIN4 is consistent with the above diagnosis.     L. PROSTATE NEEDLE BIOPSY LEFT POSTERIOR BASE:   Prostatic adenocarcinoma, [place] score 6 (3+3), grade group 1, involving 1 of 1 cores, approximately 5% of submitted tissue     Note: No intraductal carcinoma identified.  Immunostain cocktail PIN4 is consistent with the above diagnosis.     M. PROSTATE BIOPSY TARGETED ONEIDA #1:   Prostatic adenocarcinoma, [place] score 7 (4+3), grade group 3, involving 3 of 5 cores, approximately  30% of submitted tissue     Note: No intraductal carcinoma or cribriform pattern identified.  Immunostain cocktail PIN4 is consistent with the above diagnosis.       Electronically signed by Jelly Gautam DO on 3/26/2025  at 1036        Assessment:     No diagnosis found.    Plan:   We dicussed his presentation in detail.    Continue Flomax to 0.8 mg daily to improve urinary flow.  Reviewed and interpreted pathology results with patient at last visit that revealed GG3 prostate cancer  Discussed the different management options with patient which include but are not limited to active surveillance, prostatectomy, and radiation. We discussed the risks and benefits of each management option  He saw Dr. Mc in radiation Oncology to explore radiation therapy as treatment option  Patient elects surgery  RTC in 2 weeks for bone biopsy     23 minutes total spent on patient's care today; >50% time spent on counseling/coordination of care      Scribe Attestation  By signing my name below, Zoila MCKINNON Scribe   attest that this documentation has been prepared under the direction and in the presence of Dario Barraza MD.

## 2025-05-08 DIAGNOSIS — C61 MALIGNANT NEOPLASM OF PROSTATE (MULTI): ICD-10-CM

## 2025-06-04 ENCOUNTER — APPOINTMENT (OUTPATIENT)
Dept: PRIMARY CARE | Facility: CLINIC | Age: 70
End: 2025-06-04
Payer: MEDICARE

## 2025-06-04 VITALS
WEIGHT: 222 LBS | BODY MASS INDEX: 36.94 KG/M2 | TEMPERATURE: 97.1 F | SYSTOLIC BLOOD PRESSURE: 120 MMHG | DIASTOLIC BLOOD PRESSURE: 76 MMHG | HEART RATE: 72 BPM

## 2025-06-04 DIAGNOSIS — E78.49 FAMILIAL COMBINED HYPERLIPIDEMIA: ICD-10-CM

## 2025-06-04 DIAGNOSIS — E11.9 DIABETES MELLITUS TYPE 2 WITHOUT RETINOPATHY (MULTI): Primary | ICD-10-CM

## 2025-06-04 DIAGNOSIS — E55.9 VITAMIN D DEFICIENCY: ICD-10-CM

## 2025-06-04 DIAGNOSIS — R53.81 MALAISE: ICD-10-CM

## 2025-06-04 DIAGNOSIS — I10 BENIGN ESSENTIAL HYPERTENSION: ICD-10-CM

## 2025-06-04 LAB — POC HEMOGLOBIN A1C: 8.3 % (ref 4.2–6.5)

## 2025-06-04 PROCEDURE — 1159F MED LIST DOCD IN RCRD: CPT | Performed by: INTERNAL MEDICINE

## 2025-06-04 PROCEDURE — 3078F DIAST BP <80 MM HG: CPT | Performed by: INTERNAL MEDICINE

## 2025-06-04 PROCEDURE — 3052F HG A1C>EQUAL 8.0%<EQUAL 9.0%: CPT | Performed by: INTERNAL MEDICINE

## 2025-06-04 PROCEDURE — 4004F PT TOBACCO SCREEN RCVD TLK: CPT | Performed by: INTERNAL MEDICINE

## 2025-06-04 PROCEDURE — G2211 COMPLEX E/M VISIT ADD ON: HCPCS | Performed by: INTERNAL MEDICINE

## 2025-06-04 PROCEDURE — 99213 OFFICE O/P EST LOW 20 MIN: CPT | Performed by: INTERNAL MEDICINE

## 2025-06-04 PROCEDURE — 1160F RVW MEDS BY RX/DR IN RCRD: CPT | Performed by: INTERNAL MEDICINE

## 2025-06-04 PROCEDURE — 83036 HEMOGLOBIN GLYCOSYLATED A1C: CPT | Performed by: INTERNAL MEDICINE

## 2025-06-04 PROCEDURE — 3074F SYST BP LT 130 MM HG: CPT | Performed by: INTERNAL MEDICINE

## 2025-06-04 ASSESSMENT — ENCOUNTER SYMPTOMS
WEAKNESS: 0
TREMORS: 0
FLANK PAIN: 0
BLOOD IN STOOL: 0
VOICE CHANGE: 0
LIGHT-HEADEDNESS: 0
EYE PAIN: 0
SEIZURES: 0
BACK PAIN: 0
PHOTOPHOBIA: 0
RHINORRHEA: 0
NECK STIFFNESS: 0
FACIAL ASYMMETRY: 0
COLOR CHANGE: 0
SLEEP DISTURBANCE: 0
ANAL BLEEDING: 0
FATIGUE: 0
CHILLS: 0
DIARRHEA: 0
SHORTNESS OF BREATH: 0
WHEEZING: 0
ARTHRALGIAS: 0
EYE DISCHARGE: 0
NUMBNESS: 0
CONSTIPATION: 0
APPETITE CHANGE: 0
DIZZINESS: 0
SPEECH DIFFICULTY: 0
DIAPHORESIS: 0
COUGH: 0
JOINT SWELLING: 0
SORE THROAT: 0
POLYDIPSIA: 0
STRIDOR: 0
BRUISES/BLEEDS EASILY: 0
WOUND: 0
PALPITATIONS: 0
ADENOPATHY: 0
FACIAL SWELLING: 0
HEADACHES: 0
CHEST TIGHTNESS: 0
EYE REDNESS: 0
ABDOMINAL DISTENTION: 0
NECK PAIN: 0
DYSURIA: 0
POLYPHAGIA: 0
VOMITING: 0
DIFFICULTY URINATING: 0
HEMATURIA: 0
ABDOMINAL PAIN: 0
MYALGIAS: 0
EYE ITCHING: 0
ACTIVITY CHANGE: 0
CHOKING: 0
TROUBLE SWALLOWING: 0
SINUS PRESSURE: 0
SINUS PAIN: 0
NAUSEA: 0
RECTAL PAIN: 0
FREQUENCY: 1

## 2025-06-04 NOTE — PROGRESS NOTES
Subjective   Patient ID: Juan Antonio Araujo is a 69 y.o. male who presents for Follow-up.  History of Present Illness  The patient presents for evaluation of hypertension, hyperlipidemia, prostate cancer, diabetes, vision disorder, higher body weight, health maintenance, and back pain.    He is scheduled for a biopsy on 2025.    His current medication regimen includes atorvastatin, Jardiance, glimepiride, lisinopril, and tamsulosin (2 tablets daily). Despite the tamsulosin, he reports no improvement in his urinary symptoms, necessitating nightly bathroom visits. He is not experiencing any headaches or dizziness but does report shortness of breath upon exertion. He also reports no gastrointestinal symptoms such as stomach pain, nausea, vomiting, or diarrhea. The primary concern is his ocular health, with complaints of nighttime discomfort and difficulty tolerating bright lights. He has not sought ophthalmological consultation for these issues.    He expresses reluctance towards insulin therapy and Mounjaro due to concerns about potential side effects. He also declines the use of extended-release metformin, citing previous experiences of diarrhea with the medication. He has a general aversion to medication, attributing this to his father's death at age 57 from diabetes, which he believes was caused by the medications. He also mentions that his sister, who has multiple sclerosis, experienced kidney and liver damage from her medications. He declines a referral to an endocrinologist.    FAMILY HISTORY  His father  at age 57 from diabetes. His sister has multiple sclerosis and experienced kidney and liver damage from her medications.      PMHx, FHx, Social Hx, Surg Hx personally reviewed at this appointment. No pertinent findings and/or changes from prior (if applicable).    ROS: Unless specified above, pt denies wt gain/loss f/c HA LoC CP SOB NVDC. See HPI above, and scanned sheet (if applicable). All other  systems are reviewed and are without complaint.   Review of Systems   Constitutional:  Negative for activity change, appetite change, chills, diaphoresis and fatigue.   HENT:  Negative for congestion, dental problem, drooling, ear discharge, ear pain, facial swelling, hearing loss, mouth sores, nosebleeds, postnasal drip, rhinorrhea, sinus pressure, sinus pain, sneezing, sore throat, tinnitus, trouble swallowing and voice change.    Eyes:  Positive for visual disturbance. Negative for photophobia, pain, discharge, redness and itching.   Respiratory:  Negative for cough, choking, chest tightness, shortness of breath, wheezing and stridor.    Cardiovascular:  Negative for chest pain, palpitations and leg swelling.   Gastrointestinal:  Negative for abdominal distention, abdominal pain, anal bleeding, blood in stool, constipation, diarrhea, nausea, rectal pain and vomiting.   Endocrine: Negative for cold intolerance, heat intolerance, polydipsia, polyphagia and polyuria.   Genitourinary:  Positive for frequency. Negative for decreased urine volume, difficulty urinating, dysuria, enuresis, flank pain, genital sores, hematuria and urgency.   Musculoskeletal:  Negative for arthralgias, back pain, gait problem, joint swelling, myalgias, neck pain and neck stiffness.   Skin:  Negative for color change, pallor, rash and wound.   Neurological:  Negative for dizziness, tremors, seizures, syncope, facial asymmetry, speech difficulty, weakness, light-headedness, numbness and headaches.   Hematological:  Negative for adenopathy. Does not bruise/bleed easily.   Psychiatric/Behavioral:  Negative for sleep disturbance.       Objective     Temp 36.2 °C (97.1 °F) (Temporal)   Wt 101 kg (222 lb)   BMI 36.94 kg/m²      Physical Exam    Physical Exam  Constitutional:       Appearance: Normal appearance.   Cardiovascular:      Rate and Rhythm: Normal rate and regular rhythm.      Heart sounds: No murmur heard.     No gallop.   Pulmonary:       Effort: No respiratory distress.      Breath sounds: No wheezing or rales.   Abdominal:      General: There is no distension.      Palpations: There is no mass.      Tenderness: There is no abdominal tenderness. There is no guarding.   Musculoskeletal:      Right lower leg: No edema.      Left lower leg: No edema.   Neurological:      Mental Status: He is alert.          Lab Results   Component Value Date    WBC 6.6 02/28/2025    HGB 15.8 02/28/2025    HCT 48.6 02/28/2025     (L) 02/28/2025    CHOL 140 10/21/2024    TRIG 190 (H) 10/21/2024    HDL 30.4 10/21/2024    ALT 35 10/21/2024    AST 58 (H) 10/21/2024     02/28/2025    K 4.1 02/28/2025     02/28/2025    CREATININE 1.57 (H) 02/28/2025    BUN 19 02/28/2025    CO2 32 02/28/2025    TSH 2.20 04/01/2024    PSA 5.33 (H) 10/21/2024    HGBA1C 8.4 (H) 02/28/2025     par     Current Outpatient Medications   Medication Instructions    aspirin 81 mg EC tablet 1 tablet, Daily    atorvastatin (LIPITOR) 80 mg, oral, Daily    empagliflozin (JARDIANCE) 25 mg, oral, Daily    glimepiride (AMARYL) 4 mg, oral, 2 times daily    lisinopriL-hydrochlorothiazide 20-25 mg tablet 1 tablet, oral, Daily    tamsulosin (FLOMAX) 0.8 mg, oral, Daily        XR pelvis 1-2 views  Narrative: Interpreted By:  Reina Rubi,   STUDY:  Single view pelvis.      INDICATION:  Signs/Symptoms:evaluate paget's disease in pelvis.      COMPARISON:  Low-dose CT from [facility] dated 04/04/2025.      ACCESSION NUMBER(S):  [id number]      ORDERING CLINICIAN:  GUME MARIE      FINDINGS:  No acute fracture or malalignment.  Bilateral hip joints are unremarkable with well preserved joint  spaces. There is subtle coarsening of the trabeculae of the right  superior/inferior pubic rami.      Soft tissues are within normal limits.      Impression: 1. Subtle coarsening of trabecula of the right superior/inferior  pubic rami which may represent Paget's disease.      MACRO:  None.      Signed by:  Reina Rubi 4/8/2025 4:14 PM  Dictation workstation:   [id number]           Assessment & Plan  1. Hypertension.  His hypertension remains stable under the current medication regimen.    2. Hyperlipidemia.  A fasting lipid profile will be conducted to monitor his hyperlipidemia.    3. Prostate cancer.  He is scheduled for a bone biopsy on 06/11/2025. A follow-up appointment with urology will be arranged.    4. Diabetes.  His hemoglobin A1c level is currently at 8.4, previously 8.5 on 12/2024. Despite the explanation of risks associated with poor diabetic control, he has declined any modifications to his current medication regimen. He will arrange an appointment with an ophthalmologist.    5. Vision disorder.  An ophthalmology appointment will be scheduled to address his vision concerns, particularly issues with bright lights at night.    6. Obesity.  He has been advised to implement dietary changes and increase physical activity.    7. Health maintenance.  He has completed a colonoscopy. He has expressed disinterest in receiving further immunizations, including tetanus, COVID, and shingles vaccines.Schedule eye + dental appt    8. Back pain.  His back pain symptoms remain stable.  Obesity-he will diet and exercise          Yogesh Garcia MD       This medical note was created with the assistance of artificial intelligence (AI) for documentation purposes. The content has been reviewed and confirmed by the healthcare provider for accuracy and completeness. Patient consented to the use of audio recording and use of AI during their visit.

## 2025-06-05 LAB
25(OH)D3+25(OH)D2 SERPL-MCNC: 28 NG/ML (ref 30–100)
ALBUMIN SERPL-MCNC: 4.2 G/DL (ref 3.6–5.1)
ALBUMIN/CREAT UR: 10 MG/G CREAT
ALP SERPL-CCNC: 117 U/L (ref 35–144)
ALT SERPL-CCNC: 21 U/L (ref 9–46)
ANION GAP SERPL CALCULATED.4IONS-SCNC: 10 MMOL/L (CALC) (ref 7–17)
APPEARANCE UR: CLEAR
AST SERPL-CCNC: 36 U/L (ref 10–35)
BASOPHILS # BLD AUTO: 72 CELLS/UL (ref 0–200)
BASOPHILS NFR BLD AUTO: 1.1 %
BILIRUB SERPL-MCNC: 0.5 MG/DL (ref 0.2–1.2)
BILIRUB UR QL STRIP: NEGATIVE
BUN SERPL-MCNC: 19 MG/DL (ref 7–25)
CALCIUM SERPL-MCNC: 9.3 MG/DL (ref 8.6–10.3)
CHLORIDE SERPL-SCNC: 100 MMOL/L (ref 98–110)
CHOLEST SERPL-MCNC: 168 MG/DL
CHOLEST/HDLC SERPL: 5.3 (CALC)
CO2 SERPL-SCNC: 27 MMOL/L (ref 20–32)
COLOR UR: YELLOW
CREAT SERPL-MCNC: 1.35 MG/DL (ref 0.7–1.28)
CREAT UR-MCNC: 108 MG/DL (ref 20–320)
EGFRCR SERPLBLD CKD-EPI 2021: 56 ML/MIN/1.73M2
EOSINOPHIL # BLD AUTO: 169 CELLS/UL (ref 15–500)
EOSINOPHIL NFR BLD AUTO: 2.6 %
ERYTHROCYTE [DISTWIDTH] IN BLOOD BY AUTOMATED COUNT: 13.7 % (ref 11–15)
GLUCOSE SERPL-MCNC: 171 MG/DL (ref 65–99)
GLUCOSE UR QL STRIP: ABNORMAL
HCT VFR BLD AUTO: 49.5 % (ref 38.5–50)
HDLC SERPL-MCNC: 32 MG/DL
HGB BLD-MCNC: 16.2 G/DL (ref 13.2–17.1)
HGB UR QL STRIP: NEGATIVE
KETONES UR QL STRIP: NEGATIVE
LDLC SERPL CALC-MCNC: 95 MG/DL (CALC)
LEUKOCYTE ESTERASE UR QL STRIP: NEGATIVE
LYMPHOCYTES # BLD AUTO: 1788 CELLS/UL (ref 850–3900)
LYMPHOCYTES NFR BLD AUTO: 27.5 %
MCH RBC QN AUTO: 29.6 PG (ref 27–33)
MCHC RBC AUTO-ENTMCNC: 32.7 G/DL (ref 32–36)
MCV RBC AUTO: 90.3 FL (ref 80–100)
MICROALBUMIN UR-MCNC: 1.1 MG/DL
MONOCYTES # BLD AUTO: 572 CELLS/UL (ref 200–950)
MONOCYTES NFR BLD AUTO: 8.8 %
NEUTROPHILS # BLD AUTO: 3900 CELLS/UL (ref 1500–7800)
NEUTROPHILS NFR BLD AUTO: 60 %
NITRITE UR QL STRIP: NEGATIVE
NONHDLC SERPL-MCNC: 136 MG/DL (CALC)
PH UR STRIP: ABNORMAL [PH] (ref 5–8)
PLATELET # BLD AUTO: 133 THOUSAND/UL (ref 140–400)
PMV BLD REES-ECKER: 11.1 FL (ref 7.5–12.5)
POTASSIUM SERPL-SCNC: 3.8 MMOL/L (ref 3.5–5.3)
PROT SERPL-MCNC: 7 G/DL (ref 6.1–8.1)
PROT UR QL STRIP: NEGATIVE
RBC # BLD AUTO: 5.48 MILLION/UL (ref 4.2–5.8)
SODIUM SERPL-SCNC: 137 MMOL/L (ref 135–146)
SP GR UR STRIP: 1.02 (ref 1–1.03)
TRIGL SERPL-MCNC: 285 MG/DL
TSH SERPL-ACNC: 1.11 MIU/L (ref 0.4–4.5)
URATE SERPL-MCNC: 7.9 MG/DL (ref 4–8)
WBC # BLD AUTO: 6.5 THOUSAND/UL (ref 3.8–10.8)

## 2025-06-05 NOTE — PROGRESS NOTES
Subjective   Patient ID: Juan Antonio Araujo is a 69 y.o. male who presents for No chief complaint on file..  History of Present Illness        PMHx, FHx, Social Hx, Surg Hx personally reviewed at this appointment. No pertinent findings and/or changes from prior (if applicable).    ROS: Unless specified above, pt denies wt gain/loss f/c HA LoC CP SOB NVDC. See HPI above, and scanned sheet (if applicable). All other systems are reviewed and are without complaint.     Objective     There were no vitals taken for this visit.     Physical Exam        Lab Results   Component Value Date    WBC 6.5 06/04/2025    HGB 16.2 06/04/2025    HCT 49.5 06/04/2025     (L) 06/04/2025    CHOL 168 06/04/2025    TRIG 285 (H) 06/04/2025    HDL 32 (L) 06/04/2025    ALT 21 06/04/2025    AST 36 (H) 06/04/2025     06/04/2025    K 3.8 06/04/2025     06/04/2025    CREATININE 1.35 (H) 06/04/2025    BUN 19 06/04/2025    CO2 27 06/04/2025    TSH 1.11 06/04/2025    PSA 5.33 (H) 10/21/2024    HGBA1C 8.3 (A) 06/04/2025     par     Current Outpatient Medications   Medication Instructions    aspirin 81 mg EC tablet 1 tablet, Daily    atorvastatin (LIPITOR) 80 mg, oral, Daily    empagliflozin (JARDIANCE) 25 mg, oral, Daily    glimepiride (AMARYL) 4 mg, oral, 2 times daily    lisinopriL-hydrochlorothiazide 20-25 mg tablet 1 tablet, oral, Daily    tamsulosin (FLOMAX) 0.8 mg, oral, Daily        XR pelvis 1-2 views  Narrative: Interpreted By:  Reina Rubi,   STUDY:  Single view pelvis.      INDICATION:  Signs/Symptoms:evaluate paget's disease in pelvis.      COMPARISON:  Low-dose CT from [facility] dated 04/04/2025.      ACCESSION NUMBER(S):  [id number]      ORDERING CLINICIAN:  GMUE MARIE      FINDINGS:  No acute fracture or malalignment.  Bilateral hip joints are unremarkable with well preserved joint  spaces. There is subtle coarsening of the trabeculae of the right  superior/inferior pubic rami.      Soft tissues are within normal  limits.      Impression: 1. Subtle coarsening of trabecula of the right superior/inferior  pubic rami which may represent Paget's disease.      MACRO:  None.      Signed by: Reina Rubi 4/8/2025 4:14 PM  Dictation workstation:   [id number]           Assessment & Plan            Yogesh Garcia MD       This medical note was created with the assistance of artificial intelligence (AI) for documentation purposes. The content has been reviewed and confirmed by the healthcare provider for accuracy and completeness. Patient consented to the use of audio recording and use of AI during their visit.

## 2025-06-06 ENCOUNTER — PREP FOR PROCEDURE (OUTPATIENT)
Dept: RADIOLOGY | Facility: HOSPITAL | Age: 70
End: 2025-06-06
Payer: MEDICARE

## 2025-06-06 DIAGNOSIS — Z98.890 HISTORY OF PROSTATE BIOPSY: ICD-10-CM

## 2025-06-06 DIAGNOSIS — C61 MALIGNANT NEOPLASM OF PROSTATE (MULTI): Primary | ICD-10-CM

## 2025-06-10 ENCOUNTER — LAB (OUTPATIENT)
Dept: LAB | Facility: HOSPITAL | Age: 70
End: 2025-06-10
Payer: MEDICARE

## 2025-06-10 DIAGNOSIS — C61 MALIGNANT NEOPLASM OF PROSTATE (MULTI): Primary | ICD-10-CM

## 2025-06-10 LAB
INR PPP: 1 (ref 0.9–1.1)
PROTHROMBIN TIME: 11.5 SECONDS (ref 9.8–12.4)

## 2025-06-10 PROCEDURE — 36415 COLL VENOUS BLD VENIPUNCTURE: CPT

## 2025-06-10 PROCEDURE — 85610 PROTHROMBIN TIME: CPT

## 2025-06-11 ENCOUNTER — HOSPITAL ENCOUNTER (OUTPATIENT)
Dept: RADIOLOGY | Facility: HOSPITAL | Age: 70
Discharge: HOME | End: 2025-06-11
Payer: MEDICARE

## 2025-06-11 VITALS
OXYGEN SATURATION: 95 % | RESPIRATION RATE: 16 BRPM | TEMPERATURE: 98.6 F | HEIGHT: 65 IN | DIASTOLIC BLOOD PRESSURE: 70 MMHG | SYSTOLIC BLOOD PRESSURE: 146 MMHG | HEART RATE: 74 BPM | WEIGHT: 225 LBS | BODY MASS INDEX: 37.49 KG/M2

## 2025-06-11 DIAGNOSIS — C61 MALIGNANT NEOPLASM OF PROSTATE (MULTI): Primary | ICD-10-CM

## 2025-06-11 PROCEDURE — 99152 MOD SED SAME PHYS/QHP 5/>YRS: CPT

## 2025-06-11 PROCEDURE — 99153 MOD SED SAME PHYS/QHP EA: CPT

## 2025-06-11 PROCEDURE — 2500000004 HC RX 250 GENERAL PHARMACY W/ HCPCS (ALT 636 FOR OP/ED): Performed by: STUDENT IN AN ORGANIZED HEALTH CARE EDUCATION/TRAINING PROGRAM

## 2025-06-11 PROCEDURE — 99152 MOD SED SAME PHYS/QHP 5/>YRS: CPT | Performed by: STUDENT IN AN ORGANIZED HEALTH CARE EDUCATION/TRAINING PROGRAM

## 2025-06-11 PROCEDURE — 7100000009 HC PHASE TWO TIME - INITIAL BASE CHARGE

## 2025-06-11 PROCEDURE — 77012 CT SCAN FOR NEEDLE BIOPSY: CPT

## 2025-06-11 PROCEDURE — 2720000007 HC OR 272 NO HCPCS

## 2025-06-11 PROCEDURE — 7100000010 HC PHASE TWO TIME - EACH INCREMENTAL 1 MINUTE

## 2025-06-11 RX ORDER — LIDOCAINE HYDROCHLORIDE 10 MG/ML
INJECTION, SOLUTION EPIDURAL; INFILTRATION; INTRACAUDAL; PERINEURAL
Status: COMPLETED | OUTPATIENT
Start: 2025-06-11 | End: 2025-06-11

## 2025-06-11 RX ORDER — FENTANYL CITRATE 50 UG/ML
INJECTION, SOLUTION INTRAMUSCULAR; INTRAVENOUS
Status: COMPLETED | OUTPATIENT
Start: 2025-06-11 | End: 2025-06-11

## 2025-06-11 RX ORDER — MIDAZOLAM HYDROCHLORIDE 1 MG/ML
INJECTION INTRAMUSCULAR; INTRAVENOUS
Status: COMPLETED | OUTPATIENT
Start: 2025-06-11 | End: 2025-06-11

## 2025-06-11 RX ADMIN — MIDAZOLAM HYDROCHLORIDE 1 MG: 1 INJECTION, SOLUTION INTRAMUSCULAR; INTRAVENOUS at 11:28

## 2025-06-11 RX ADMIN — LIDOCAINE HYDROCHLORIDE 10 ML: 10 INJECTION, SOLUTION EPIDURAL; INFILTRATION; INTRACAUDAL; PERINEURAL at 11:40

## 2025-06-11 RX ADMIN — FENTANYL CITRATE 50 MCG: 50 INJECTION, SOLUTION INTRAMUSCULAR; INTRAVENOUS at 11:28

## 2025-06-11 ASSESSMENT — PAIN SCALES - GENERAL
PAINLEVEL_OUTOF10: 0 - NO PAIN

## 2025-06-11 ASSESSMENT — PAIN - FUNCTIONAL ASSESSMENT
PAIN_FUNCTIONAL_ASSESSMENT: 0-10

## 2025-06-11 NOTE — DISCHARGE INSTRUCTIONS
Bone Marrow Biopsy and Aspiration    What it is  Bone marrow is the soft, spongy substance found inside the center of most bones. The bone marrow makes blood cells. If we need to look at the blood cells in your bone marrow, your doctor will order a bone marrow biopsy and aspiration. It is is often done in a hospital room or a procedure room in your doctor’s office. If it’s done in your doctor’s office, plan to be there at least 1 to 2 hours. This gives time for checking in, any needed blood work and the test.  During a bone marrow biopsy, a small sample of bone with bone marrow inside it is removed.  It’s most often taken from the hip bone. Sometimes it’s taken from the sternum (breastbone).  Before the biopsy, a small part of your skin and the surface of the bone under it are numbed with medicine. Then, a special wide needle is pushed into the bone. A liquid bone marrow sample is removed with a syringe that’s attached to the needle. The syringe is removed and the needle is turned to remove a sample of bone and bone marrow. All samples are sent to a lab to be looked at under a microscope. It may take the lab a few days to complete a report with your test results.    How to plan and what to expect  Before the test  ? Tell your doctor or nurse if you take aspirin or any blood thinning medicines like Coumadin(warfarin), Lovenox (enoxaparin) or Plavix (clopidogrel). Ask if you should change the way you take these medicines before or after your test.  ? You may eat and drink before the test.  ? If you will be given medicine to help you relax (called a sedative), you will need a family member or friend to drive you home.  [id number]    [facility], Revised 10/17  Bone Marrow Biopsy and Aspiration; [facility] [place] [facility] Page 2 of 2    During the test  ? You will lie on an exam table. If your hip is used, you will lie on your side or on your stomach. If your breastbone is used, you will lie on your back.  ? The skin on your lower  back or breastbone is cleaned and numbed with medicine. Then a needle is put into the bone to draw out the bone marrow. You may feel pressure. You may also have some brief pain or a burning feeling as the marrow is removed. It’s important to lie still during the test and breathe normally.      After the test  After the needle is taken out, 1 or 2 bandages are placed over the biopsy site. You can remove them the next day when you shower. Look at the biopsy site for the next 2 to 3 days.    You may need to use a mirror or have someone look for you. Check for these signs of infection or bleeding:  ? Pain or pressure around the site. It is normal for the site to feel a little tender for a few days.  ? Fever of 100.4°F (38°C) or higher  ? Shaking and/or chills  ? Pus or bad-smelling drainage from the site  ? Warmth, swelling and/or redness at the site  ? Any bleeding or a bruise larger than a half dollar. A small bruise is normal.    Call your doctor right away if you have any of the problems listed above, or if you have other concerns or questions.      Other things to know  If you’re given medicine to help you relax during the test (called a sedative), do not drive, operate or use any heavy machinery, or drink alcohol for 24 hours after the test.  This info is a general resource. It is not meant to replace your doctor’s advice. Ask your doctor or health care team any questions. Always follow their instructions.    Keep dressing on for 2 days. May shower, no baths or swimming. No heavy lifting or strenuous activity for 2-3 days post procedure.     Keep dressing on for 2 days. Check the site for any signs of infection after removing the dressing. You may shower with the dressing on, but no baths/swimming/submerging underwater while dressing is in place. No heavy lifting (max 10lbs) for 2-3 days following the procedure. No strenuous activity or exercise for 1 week following the procedure.   If you have received sedation  today, no driving, operating heavy machinery, drinking alcohol or making any important decisions for 24 hours post procedure.

## 2025-06-11 NOTE — POST-PROCEDURE NOTE
Interventional Radiology Brief Postprocedure Note    Attending: Edgardo Gardner    Diagnosis: Prostate cancer    Description of procedure: CT guided bone lesion biopsy of the lucent, PET avid lesions within the right posterior iliac crest     Anesthesia:  Local and MAC Moderate    Complications: None    Estimated Blood Loss: minimal    Medications (Filter: Administrations occurring from 1122 to 1214 on 06/11/25) As of 06/11/25 1214      fentaNYL PF (Sublimaze) injection (mcg) Total dose:  50 mcg      Date/Time Rate/Dose/Volume Action       06/11/25  1128 50 mcg Given               midazolam (Versed) injection (mg) Total dose:  1 mg      Date/Time Rate/Dose/Volume Action       06/11/25  1128 1 mg Given               lidocaine PF (Xylocaine) 10 mg/mL (1 %) injection (mL) Total volume:  10 mL      Date/Time Rate/Dose/Volume Action       06/11/25  1140 10 mL Given                   ID Type Source Tests Collected by Time   1 : Right posterior iliac bone lesion biopsy Tissue BONE BIOPSY (NO DECAL) SURGICAL PATHOLOGY EXAM Salomón Gardner MD 6/11/2025 1136         See detailed result report with images in PACS.    The patient tolerated the procedure well without incident or complication and is in stable condition.

## 2025-06-11 NOTE — PRE-PROCEDURE NOTE
Interventional Radiology Preprocedure Note    Indication for procedure: The encounter diagnosis was Malignant neoplasm of prostate (Multi).    Relevant review of systems: NA    Relevant Labs:   Lab Results   Component Value Date    CREATININE 1.35 (H) 06/04/2025    EGFR 56 (L) 06/04/2025    INR 1.0 06/10/2025    PROTIME 11.5 06/10/2025       Planned Sedation/Anesthesia: Moderate    Airway assessment: normal    Directed physical examination:    GENERAL: awake, no acute distress  HEENT: AT/NC  NECK: supple  CV: RRR  PULM: non-labored breathing  ABDOMEN: soft, ND  NEURO: AAOx3  MSK: full ROM  SKIN: no rash      Mallampati: II (hard and soft palate, upper portion of tonsils and uvula visible)    ASA Score: ASA 2 - Patient with mild systemic disease with no functional limitations    Benefits, risks and alternatives of procedure and planned sedation have been discussed with the patient and/or their representative. All questions answered and they agree to proceed.

## 2025-06-17 LAB
LAB AP ASR DISCLAIMER: NORMAL
LABORATORY COMMENT REPORT: NORMAL
PATH REPORT.COMMENTS IMP SPEC: NORMAL
PATH REPORT.FINAL DX SPEC: NORMAL
PATH REPORT.GROSS SPEC: NORMAL
PATH REPORT.TOTAL CANCER: NORMAL

## 2025-07-02 ENCOUNTER — TELEMEDICINE (OUTPATIENT)
Dept: UROLOGY | Facility: HOSPITAL | Age: 70
End: 2025-07-02
Payer: MEDICARE

## 2025-07-02 DIAGNOSIS — N52.9 MALE ERECTILE DISORDER: ICD-10-CM

## 2025-07-02 DIAGNOSIS — C61 MALIGNANT NEOPLASM OF PROSTATE (MULTI): Primary | ICD-10-CM

## 2025-07-02 PROCEDURE — G2211 COMPLEX E/M VISIT ADD ON: HCPCS | Performed by: STUDENT IN AN ORGANIZED HEALTH CARE EDUCATION/TRAINING PROGRAM

## 2025-07-02 PROCEDURE — 3052F HG A1C>EQUAL 8.0%<EQUAL 9.0%: CPT | Performed by: STUDENT IN AN ORGANIZED HEALTH CARE EDUCATION/TRAINING PROGRAM

## 2025-07-02 PROCEDURE — 99213 OFFICE O/P EST LOW 20 MIN: CPT | Performed by: STUDENT IN AN ORGANIZED HEALTH CARE EDUCATION/TRAINING PROGRAM

## 2025-07-02 RX ORDER — TADALAFIL 5 MG/1
5 TABLET ORAL DAILY
Qty: 30 TABLET | Refills: 11 | Status: SHIPPED | OUTPATIENT
Start: 2025-07-02 | End: 2026-06-27

## 2025-07-02 NOTE — PROGRESS NOTES
Virtual or Telephone Consent    An interactive audio and video telecommunication system which permits real time communications between the patient (at the originating site) and provider (at the distant site) was utilized to provide this telehealth service.   Verbal consent was requested and obtained from Juan Antonio Araujo on this date, 07/02/25 for a telehealth visit and the patient's location was confirmed at the time of the visit.   UROLOGIC FOLLOW UP VISIT     PROBLEM LIST:  1. Malignant neoplasm of prostate (Multi)  tadalafil (Cialis) 5 mg tablet      2. Male erectile disorder  tadalafil (Cialis) 5 mg tablet        HISTORY OF PRESENT ILLNESS:   Juan Antonio Araujo is a  68 year old male  with history of elevated PSA. Denies any family history of cancer. Denies any family history of prostate cancer. MR prostate completed 8/15/2024.     Denies hematuria, dysuria, nocturia, flank pain, and bothersome frequency or urgency. Denies pushing or straining to void and states he feels he empties his bladder when voiding. Post-void residual 8/15/24 208 cc. He currently utilizes Flomax 0.4mg.     Interim history:  3/31/25: he presents for follow up today. He is s/p transperineal prostate biopsy on 3/14/25. He reports doing overall well with no acute concerns. Denies any pain. Reports 2 episodes of blood in urine, now resolved. He reports having weak stream and going up to 3-4 times to urinate at night, compared to 2 times prior to the biopsy. He denies any fevers, chills, nausea, vomiting.  5/7/25: This is a telehealth visit. Since the last visit, he reports thing have been about the same. He has seen DR. Mc on 4/8/25 for exploring treatment options, who recommended options including surgery or definitive external beam radiation therapy (EBRT) with a short course of hormone suppression. He would like to proceed with surgery. He denies any fevers, chills, nausea, vomiting.  7/2/25: This is a telehealth visit. The patient has  been doing about the same since last visit. He had a bone biopsy that was negative. He would like to continue with removal of prostate. He denies any fevers, chills, nausea, vomiting.      PAST MEDICAL HISTORY:  PSA Trend:  10/21/24: 5.33  4/1/2024:6.28  9/22/2023: 4.01  3/16/2023: 4.01  11/6/2020: 2.48    Past Medical History:   Diagnosis Date    CKD (chronic kidney disease)     10/21/24 BUN 22, Creatinine 1.48, GFR 51    Essential (primary) hypertension 09/24/2013    Benign essential hypertension    Hyperlipidemia     10/21/24 chol 140, LDL 72, Triglycerides 190    Lower abdominal pain, unspecified     Groin pain    Lumbar radiculopathy     Obesity     BMI 37.61    Personal history of other endocrine, nutritional and metabolic disease     History of hyperlipidemia    Prostate cancer (Multi)     Sciatica     Spinal stenosis     Type 2 diabetes mellitus     1/27/25 A1c 8.9. blood sugar at home typically 140's       PAST SURGICAL HISTORY:  Past Surgical History:   Procedure Laterality Date    COLONOSCOPY  05/06/2016    Complete Colonoscopy    COLONOSCOPY  01/2021    rpt 1-26    ROTATOR CUFF REPAIR Bilateral     Rotator Cuff Repair        ALLERGIES:   No Known Allergies     MEDICATIONS:     Current Outpatient Medications:     aspirin 81 mg EC tablet, Take 1 tablet (81 mg) by mouth once daily., Disp: , Rfl:     atorvastatin (Lipitor) 80 mg tablet, Take 1 tablet (80 mg) by mouth once daily., Disp: 90 tablet, Rfl: 3    empagliflozin (Jardiance) 25 mg, Take 1 tablet (25 mg) by mouth once daily., Disp: 90 tablet, Rfl: 3    glimepiride (Amaryl) 4 mg tablet, Take 1 tablet (4 mg) by mouth 2 times a day., Disp: 180 tablet, Rfl: 3    lisinopriL-hydrochlorothiazide 20-25 mg tablet, TAKE 1 TABLET BY MOUTH EVERY DAY, Disp: 90 tablet, Rfl: 3    tamsulosin (Flomax) 0.4 mg 24 hr capsule, Take 2 capsules (0.8 mg) by mouth once daily., Disp: 180 capsule, Rfl: 2      SOCIAL HISTORY:  Patient  reports that he has been smoking cigars. He  has been exposed to tobacco smoke. He has never used smokeless tobacco. He reports current alcohol use of about 2.0 standard drinks of alcohol per week. He reports that he does not use drugs.   Social History     Socioeconomic History    Marital status:      Spouse name: Not on file    Number of children: 3    Years of education: Not on file    Highest education level: Not on file   Occupational History    Occupation: ,    Tobacco Use    Smoking status: Some Days     Types: Cigars     Passive exposure: Current    Smokeless tobacco: Never    Tobacco comments:     Cigar on occasion- few times a week   Vaping Use    Vaping status: Never Used   Substance and Sexual Activity    Alcohol use: Yes     Alcohol/week: 2.0 standard drinks of alcohol     Types: 2 Shots of liquor per week     Comment: 2 per week    Drug use: Never    Sexual activity: Not Currently     Partners: Female   Other Topics Concern    Not on file   Social History Narrative    Not on file     Social Drivers of Health     Financial Resource Strain: Not on file   Food Insecurity: Not on file   Transportation Needs: Not on file   Physical Activity: Inactive (2023)    Exercise Vital Sign     Days of Exercise per Week: 0 days     Minutes of Exercise per Session: 0 min   Stress: Not on file   Social Connections: Not on file   Intimate Partner Violence: Not on file   Housing Stability: Not on file       FAMILY HISTORY:  Family History   Problem Relation Name Age of Onset    Diabetes Mother      Pneumonia Father      Diabetes Brother       REVIEW OF SYSTEMS:   Constitutional: Negative for fever and chills. Denies anorexia, weight loss.  Eyes: Negative for visual disturbance.   Respiratory: Negative for shortness of breath.    Cardiovascular: Negative for chest pain.   Gastrointestinal: Negative for nausea and vomiting.   Genitourinary: See interval history above.  Skin: Negative for rash.   Neurological: Negative for dizziness  and numbness.   Psychiatric/Behavioral: Negative for confusion and decreased concentration.     PHYSICAL EXAM:  There were no vitals taken for this visit.  Constitutional: Patient appears well-developed and well-nourished. No distress.    Head: Normocephalic and atraumatic.    Neck: Normal range of motion.    Cardiovascular: Normal rate.    Pulmonary/Chest: Effort normal. No respiratory distress.   Abdominal: soft NTND  Musculoskeletal: Normal range of motion.    Neurological: Alert and oriented to person, place, and time.  Psychiatric: Normal mood and affect. Behavior is normal. Thought content normal.      LABORATORY REVIEW:     Lab Results   Component Value Date    BUN 19 06/04/2025    CREATININE 1.35 (H) 06/04/2025    EGFR 56 (L) 06/04/2025     06/04/2025    K 3.8 06/04/2025     06/04/2025    CO2 27 06/04/2025    CALCIUM 9.3 06/04/2025      Lab Results   Component Value Date    WBC 6.5 06/04/2025    RBC 5.48 06/04/2025    HGB 16.2 06/04/2025    HCT 49.5 06/04/2025    MCV 90.3 06/04/2025    MCH 29.6 06/04/2025    MCHC 32.7 06/04/2025    RDW 13.7 06/04/2025     (L) 06/04/2025    MPV 11.1 06/04/2025        Lab Results   Component Value Date    PSA 5.33 (H) 10/21/2024    PSA 6.28 (H) 04/01/2024    PSA 4.01 (H) 09/22/2023    PSA 4.01 (H) 03/16/2023    PSA 2.48 11/06/2020      PATHOLOGICAL REVIEW  FINAL DIAGNOSIS   A. PROSTATE NEEDLE BIOPSY LEFT PARAMEDIAN APEX:   Prostatic adenocarcinoma, [place] score 6 (3+3), grade group 1, involving 1 of 1 cores, approximately 5% of submitted tissue     Note: No intraductal carcinoma identified.  Immunostain cocktail PIN4 is consistent with the above diagnosis.     B. PROSTATE NEEDLE BIOPSY RIGHT ANTERIOR:   Benign prostatic tissue     C. PROSTATE NEEDLE BIOPSY RIGHT LATERAL:   Benign fibrous tissue     D. PROSTATE NEEDLE BIOPSY RIGHT POSTERIOR BASE:   Benign prostatic tissue     E. PROSTATE NEEDLE BIOPSY RIGHT POSTERIOR APEX:   Prostatic adenocarcinoma, Manjula  score 7 (3+4), grade group 2, involving 1 of 1 cores, approximately 20% of submitted tissue     - Percentage of [place] pattern 4: 5%     Note: No intraductal carcinoma or cribriform pattern identified.  Immunostain cocktail PIN4 is consistent with the above diagnosis.     F. PROSTATE NEEDLE BIOPSY RIGHT PARAMEDIAN BASE:   Prostatic adenocarcinoma, [place] score 7 (3+4), grade group 2, involving 1 of 1 cores, approximately 40% of submitted tissue     - Percentage of [place] pattern 4: 5%     Note: No intraductal carcinoma or cribriform pattern identified.     G. PROSTATE NEEDLE BIOPSY RIGHT PARAMEDIAN APEX:   Prostatic adenocarcinoma, Manjula score 7 (3+4), grade group 2, involving 1 of 1 cores, approximately 20% of submitted tissue     - Percentage of [place] pattern 4: 5%  Atypical intraductal proliferation     Note: No cribriform pattern identified.  Immunostain cocktail PIN4 is consistent with the above diagnosis.     H. PROSTATE NEEDLE BIOPSY LEFT ANTERIOR:   Benign prostatic tissue     I. PROSTATE NEEDLE BIOPSY LEFT LATERAL:   Benign fibromuscular tissue     J. PROSTATE NEEDLE BIOPSY LEFT PARAMEDIAN BASE:   Prostatic adenocarcinoma, Manjula score 6 (3+3), grade group 1, involving 1 of 1 cores, approximately 10% of submitted tissue  High-grade prostatic intraepithelial neoplasia present     Note: No intraductal carcinoma identified.     K. PROSTATE NEEDLE BIOPSY LEFT POSTERIOR APEX:   Prostatic adenocarcinoma, Manjula score 6 (3+3), grade group 1, involving 1 of 3 cores, approximately 10% of submitted tissue     Note: No intraductal carcinoma identified.  Immunostain cocktail PIN4 is consistent with the above diagnosis.     L. PROSTATE NEEDLE BIOPSY LEFT POSTERIOR BASE:   Prostatic adenocarcinoma, Manjula score 6 (3+3), grade group 1, involving 1 of 1 cores, approximately 5% of submitted tissue     Note: No intraductal carcinoma identified.  Immunostain cocktail PIN4 is consistent with the above diagnosis.      KARIN PROSTATE BIOPSY TARGETED ONEIDA #1:   Prostatic adenocarcinoma, [place] score 7 (4+3), grade group 3, involving 3 of 5 cores, approximately  30% of submitted tissue     Note: No intraductal carcinoma or cribriform pattern identified.  Immunostain cocktail PIN4 is consistent with the above diagnosis.       Electronically signed by Jelly Gautam DO on 3/26/2025 at 1036        Assessment:     1. Malignant neoplasm of prostate (Multi)  tadalafil (Cialis) 5 mg tablet      2. Male erectile disorder  tadalafil (Cialis) 5 mg tablet        Plan:   We dicussed his presentation in detail.    Continue Flomax to 0.8 mg daily to improve urinary flow.  Reviewed and interpreted bone biopsy results with patient   Discussed the different management options with patient which include but are not limited to active surveillance, prostatectomy, and radiation. We discussed the risks and benefits of each management option.  Sent Cialis prescription to patients pharmacy.   Discussed in detail surgery process and after care.   The patient will be scheduled in August for robotic prostatectomy.     25 minutes total spent on patient's care today; >50% time spent on counseling/coordination of care    Scribe Attestation:  By signing my name below, ILouann Scribe attest that this documentation has been prepared under the direction and in the presence of Dario Barraza MD.    Provider Attestation - Scribe documentation:  All medical record entries made by Louann Juarez were at my direction and personally dictated by me, Dario Barraza MD. I have reviewed the chart and agree that the record is accurate and I confirmed that it reflects my personal performance of the history, physical exam, discussion, and plan.

## 2025-07-18 DIAGNOSIS — C61 PROSTATE CANCER (MULTI): Primary | ICD-10-CM

## 2025-07-18 DIAGNOSIS — N50.1 VASCULAR DISORDER OF MALE GENITAL ORGANS: ICD-10-CM

## 2025-07-18 RX ORDER — CHLORHEXIDINE GLUCONATE 40 MG/ML
SOLUTION TOPICAL DAILY PRN
OUTPATIENT
Start: 2025-07-18

## 2025-07-18 RX ORDER — CEFAZOLIN SODIUM 2 G/100ML
2 INJECTION, SOLUTION INTRAVENOUS ONCE
OUTPATIENT
Start: 2025-07-18 | End: 2025-07-18

## 2025-08-25 ENCOUNTER — CLINICAL SUPPORT (OUTPATIENT)
Dept: PREADMISSION TESTING | Facility: HOSPITAL | Age: 70
End: 2025-08-25
Payer: MEDICARE

## 2025-08-25 ENCOUNTER — APPOINTMENT (OUTPATIENT)
Dept: PREADMISSION TESTING | Facility: HOSPITAL | Age: 70
End: 2025-08-25
Payer: MEDICARE

## 2025-08-28 ENCOUNTER — APPOINTMENT (OUTPATIENT)
Dept: LAB | Facility: HOSPITAL | Age: 70
End: 2025-08-28
Payer: MEDICARE

## 2025-08-28 ENCOUNTER — PRE-ADMISSION TESTING (OUTPATIENT)
Dept: PREADMISSION TESTING | Facility: HOSPITAL | Age: 70
End: 2025-08-28
Payer: MEDICARE

## 2025-08-28 LAB
ANION GAP SERPL CALC-SCNC: 12 MMOL/L (ref 10–20)
APTT PPP: 32 SECONDS (ref 26–36)
BUN SERPL-MCNC: 15 MG/DL (ref 6–23)
CALCIUM SERPL-MCNC: 9.2 MG/DL (ref 8.6–10.3)
CHLORIDE SERPL-SCNC: 101 MMOL/L (ref 98–107)
CO2 SERPL-SCNC: 28 MMOL/L (ref 21–32)
CREAT SERPL-MCNC: 1.21 MG/DL (ref 0.5–1.3)
EGFRCR SERPLBLD CKD-EPI 2021: 64 ML/MIN/1.73M*2
ERYTHROCYTE [DISTWIDTH] IN BLOOD BY AUTOMATED COUNT: 13.7 % (ref 11.5–14.5)
GLUCOSE SERPL-MCNC: 167 MG/DL (ref 74–99)
HCT VFR BLD AUTO: 48.2 % (ref 41–52)
HGB BLD-MCNC: 16 G/DL (ref 13.5–17.5)
INR PPP: 1.1 (ref 0.9–1.1)
MCH RBC QN AUTO: 29.4 PG (ref 26–34)
MCHC RBC AUTO-ENTMCNC: 33.2 G/DL (ref 32–36)
MCV RBC AUTO: 88 FL (ref 80–100)
NRBC BLD-RTO: 0 /100 WBCS (ref 0–0)
PLATELET # BLD AUTO: 129 X10*3/UL (ref 150–450)
POTASSIUM SERPL-SCNC: 3.8 MMOL/L (ref 3.5–5.3)
PROTHROMBIN TIME: 12 SECONDS (ref 9.8–12.4)
RBC # BLD AUTO: 5.45 X10*6/UL (ref 4.5–5.9)
SODIUM SERPL-SCNC: 137 MMOL/L (ref 136–145)
WBC # BLD AUTO: 6.6 X10*3/UL (ref 4.4–11.3)

## 2025-08-28 ASSESSMENT — ENCOUNTER SYMPTOMS
SINUS CONGESTION: 0
DOUBLE VISION: 0
HEMOPTYSIS: 0
WEAKNESS: 0
COUGH: 0
NAUSEA: 0
BLOOD IN STOOL: 0
CHILLS: 0
ARTHRALGIAS: 1
CONFUSION: 0
DYSPNEA AT REST: 0
FEVER: 0
DYSPNEA WITH EXERTION: 0
LIGHT-HEADEDNESS: 0
NECK STIFFNESS: 0
PALPITATIONS: 0
EXCESSIVE BLEEDING: 0
MYALGIAS: 0
TROUBLE SWALLOWING: 0
DIARRHEA: 0
VISUAL CHANGE: 0
WOUND: 0
CONSTIPATION: 0
LIMITED RANGE OF MOTION: 0
SKIN CHANGES: 0
SHORTNESS OF BREATH: 0
NECK PAIN: 0
ABDOMINAL DISTENTION: 0
EYE DISCHARGE: 0
WHEEZING: 0
ABDOMINAL PAIN: 0
DYSURIA: 0
EYE PAIN: 0
VOMITING: 0
BRUISES/BLEEDS EASILY: 0
DIFFICULTY URINATING: 0
RHINORRHEA: 0
UNEXPECTED WEIGHT CHANGE: 0
NUMBNESS: 0

## 2025-09-04 DIAGNOSIS — E11.9 DIABETES MELLITUS TYPE 2 WITHOUT RETINOPATHY (MULTI): ICD-10-CM

## 2025-09-09 ENCOUNTER — APPOINTMENT (OUTPATIENT)
Dept: PRIMARY CARE | Facility: CLINIC | Age: 70
End: 2025-09-09
Payer: MEDICARE

## 2025-09-11 ENCOUNTER — APPOINTMENT (OUTPATIENT)
Dept: UROLOGY | Facility: CLINIC | Age: 70
End: 2025-09-11
Payer: MEDICARE

## 2026-01-21 ENCOUNTER — APPOINTMENT (OUTPATIENT)
Dept: PRIMARY CARE | Facility: CLINIC | Age: 71
End: 2026-01-21
Payer: MEDICARE

## (undated) DEVICE — PROBE COVER, ULTRASOUND 8818

## (undated) DEVICE — NEEDLE, BIOPSY, MAX CORE, 18G

## (undated) DEVICE — Device

## (undated) DEVICE — APPLICATOR, CHLORAPREP, W/ORANGE TINT, 26ML

## (undated) DEVICE — CONTAINER, SPECIMEN, 120 ML, STERILE

## (undated) DEVICE — TOWEL, SURGICAL, NEURO, O/R, 16 X 26, BLUE, STERILE

## (undated) DEVICE — MARKER, SKIN, DUAL TIP INK W/9 LABEL AND REMOVABLE TIME OUT SLEEVE

## (undated) DEVICE — DRESSING, GAUZE, 16 PLY, 4 X 4 IN, STERILE

## (undated) DEVICE — NEEDLE, SPINAL, QUINCKE, LUER LOCK, 18 G X 6 IN

## (undated) DEVICE — NEEDLE, HYPODERMIC, MONOJECT, 18 G X 1.5 IN

## (undated) DEVICE — ACCESS SYSTEM, PRECISIONPOINT, TRANSPERINEAL

## (undated) DEVICE — DRESSING, NON-ADHERENT, TELFA, OUCHLESS, 3 X 8 IN, STERILE

## (undated) DEVICE — SYRINGE, 12 CC, LUER LOCK, CONTROL, MONOJECT